# Patient Record
Sex: FEMALE | Race: AMERICAN INDIAN OR ALASKA NATIVE | NOT HISPANIC OR LATINO | URBAN - METROPOLITAN AREA
[De-identification: names, ages, dates, MRNs, and addresses within clinical notes are randomized per-mention and may not be internally consistent; named-entity substitution may affect disease eponyms.]

---

## 2023-08-20 ENCOUNTER — HOSPITAL ENCOUNTER (INPATIENT)
Facility: HOSPITAL | Age: 83
LOS: 3 days | Discharge: HOME HEALTH | DRG: 390 | End: 2023-08-25
Attending: EMERGENCY MEDICINE | Admitting: HOSPITALIST
Payer: MEDICARE

## 2023-08-20 DIAGNOSIS — K56.7 ILEUS: ICD-10-CM

## 2023-08-20 DIAGNOSIS — K56.600 PARTIAL SMALL BOWEL OBSTRUCTION: Primary | ICD-10-CM

## 2023-08-20 DIAGNOSIS — K59.03 CONSTIPATION DUE TO OPIOID THERAPY: ICD-10-CM

## 2023-08-20 DIAGNOSIS — T40.2X5A CONSTIPATION DUE TO OPIOID THERAPY: ICD-10-CM

## 2023-08-20 LAB
ALBUMIN SERPL BCP-MCNC: 3.3 G/DL (ref 3.5–5.2)
ALP SERPL-CCNC: 89 U/L (ref 55–135)
ALT SERPL W/O P-5'-P-CCNC: 16 U/L (ref 10–44)
ANION GAP SERPL CALC-SCNC: 14 MMOL/L (ref 8–16)
AST SERPL-CCNC: 24 U/L (ref 10–40)
BASOPHILS # BLD AUTO: 0.04 K/UL (ref 0–0.2)
BASOPHILS NFR BLD: 0.3 % (ref 0–1.9)
BILIRUB SERPL-MCNC: 0.4 MG/DL (ref 0.1–1)
BUN SERPL-MCNC: 15 MG/DL (ref 8–23)
CALCIUM SERPL-MCNC: 10.4 MG/DL (ref 8.7–10.5)
CHLORIDE SERPL-SCNC: 92 MMOL/L (ref 95–110)
CO2 SERPL-SCNC: 36 MMOL/L (ref 23–29)
CREAT SERPL-MCNC: 1.1 MG/DL (ref 0.5–1.4)
DIFFERENTIAL METHOD: ABNORMAL
EOSINOPHIL # BLD AUTO: 0 K/UL (ref 0–0.5)
EOSINOPHIL NFR BLD: 0.2 % (ref 0–8)
ERYTHROCYTE [DISTWIDTH] IN BLOOD BY AUTOMATED COUNT: 13.3 % (ref 11.5–14.5)
EST. GFR  (NO RACE VARIABLE): 50.2 ML/MIN/1.73 M^2
GLUCOSE SERPL-MCNC: 149 MG/DL (ref 70–110)
HCT VFR BLD AUTO: 34.9 % (ref 37–48.5)
HGB BLD-MCNC: 11.8 G/DL (ref 12–16)
IMM GRANULOCYTES # BLD AUTO: 0.14 K/UL (ref 0–0.04)
IMM GRANULOCYTES NFR BLD AUTO: 1.1 % (ref 0–0.5)
LYMPHOCYTES # BLD AUTO: 0.5 K/UL (ref 1–4.8)
LYMPHOCYTES NFR BLD: 4 % (ref 18–48)
MCH RBC QN AUTO: 30.4 PG (ref 27–31)
MCHC RBC AUTO-ENTMCNC: 33.8 G/DL (ref 32–36)
MCV RBC AUTO: 90 FL (ref 82–98)
MONOCYTES # BLD AUTO: 0.8 K/UL (ref 0.3–1)
MONOCYTES NFR BLD: 6.3 % (ref 4–15)
NEUTROPHILS # BLD AUTO: 11.6 K/UL (ref 1.8–7.7)
NEUTROPHILS NFR BLD: 88.1 % (ref 38–73)
NRBC BLD-RTO: 0 /100 WBC
PLATELET # BLD AUTO: 442 K/UL (ref 150–450)
PMV BLD AUTO: 9.4 FL (ref 9.2–12.9)
POTASSIUM SERPL-SCNC: 4 MMOL/L (ref 3.5–5.1)
PROT SERPL-MCNC: 6.7 G/DL (ref 6–8.4)
RBC # BLD AUTO: 3.88 M/UL (ref 4–5.4)
SODIUM SERPL-SCNC: 142 MMOL/L (ref 136–145)
WBC # BLD AUTO: 13.12 K/UL (ref 3.9–12.7)

## 2023-08-20 PROCEDURE — 94761 N-INVAS EAR/PLS OXIMETRY MLT: CPT | Mod: ER

## 2023-08-20 PROCEDURE — 99285 EMERGENCY DEPT VISIT HI MDM: CPT | Mod: ER

## 2023-08-20 PROCEDURE — 85025 COMPLETE CBC W/AUTO DIFF WBC: CPT | Mod: ER | Performed by: EMERGENCY MEDICINE

## 2023-08-20 PROCEDURE — 96361 HYDRATE IV INFUSION ADD-ON: CPT | Mod: ER

## 2023-08-20 PROCEDURE — 63600175 PHARM REV CODE 636 W HCPCS: Mod: ER | Performed by: EMERGENCY MEDICINE

## 2023-08-20 PROCEDURE — 80053 COMPREHEN METABOLIC PANEL: CPT | Mod: ER | Performed by: EMERGENCY MEDICINE

## 2023-08-20 RX ORDER — SODIUM CHLORIDE, SODIUM LACTATE, POTASSIUM CHLORIDE, CALCIUM CHLORIDE 600; 310; 30; 20 MG/100ML; MG/100ML; MG/100ML; MG/100ML
1000 INJECTION, SOLUTION INTRAVENOUS
Status: COMPLETED | OUTPATIENT
Start: 2023-08-20 | End: 2023-08-20

## 2023-08-20 RX ADMIN — SODIUM CHLORIDE, POTASSIUM CHLORIDE, SODIUM LACTATE AND CALCIUM CHLORIDE 500 ML: 600; 310; 30; 20 INJECTION, SOLUTION INTRAVENOUS at 11:08

## 2023-08-20 RX ADMIN — SODIUM CHLORIDE, POTASSIUM CHLORIDE, SODIUM LACTATE AND CALCIUM CHLORIDE 1000 ML: 600; 310; 30; 20 INJECTION, SOLUTION INTRAVENOUS at 10:08

## 2023-08-21 PROBLEM — T40.2X5A CONSTIPATION DUE TO OPIOID THERAPY: Status: ACTIVE | Noted: 2023-08-21

## 2023-08-21 PROBLEM — K56.609 SBO (SMALL BOWEL OBSTRUCTION): Status: ACTIVE | Noted: 2023-08-21

## 2023-08-21 PROBLEM — T40.605A NARCOTIC BOWEL SYNDROME DUE TO THERAPEUTIC USE: Status: ACTIVE | Noted: 2023-08-21

## 2023-08-21 PROBLEM — D72.829 LEUCOCYTOSIS: Status: ACTIVE | Noted: 2023-08-21

## 2023-08-21 PROBLEM — I10 HYPERTENSION: Status: ACTIVE | Noted: 2023-08-21

## 2023-08-21 PROBLEM — K59.03 CONSTIPATION DUE TO OPIOID THERAPY: Status: ACTIVE | Noted: 2023-08-21

## 2023-08-21 PROBLEM — K63.89 NARCOTIC BOWEL SYNDROME DUE TO THERAPEUTIC USE: Status: ACTIVE | Noted: 2023-08-21

## 2023-08-21 PROBLEM — D64.9 ANEMIA: Status: ACTIVE | Noted: 2023-08-21

## 2023-08-21 LAB
AMORPH CRY UR QL COMP ASSIST: ABNORMAL
BACTERIA #/AREA URNS AUTO: ABNORMAL /HPF
BILIRUB UR QL STRIP: NEGATIVE
CLARITY UR REFRACT.AUTO: CLEAR
COLOR UR AUTO: YELLOW
GLUCOSE UR QL STRIP: NEGATIVE
HGB UR QL STRIP: NEGATIVE
HYALINE CASTS UR QL AUTO: 5 /LPF
KETONES UR QL STRIP: NEGATIVE
LEUKOCYTE ESTERASE UR QL STRIP: NEGATIVE
MICROSCOPIC COMMENT: ABNORMAL
NITRITE UR QL STRIP: NEGATIVE
PH UR STRIP: >=9 [PH] (ref 5–8)
PROT UR QL STRIP: ABNORMAL
RBC #/AREA URNS AUTO: 3 /HPF (ref 0–4)
SP GR UR STRIP: 1.01 (ref 1–1.03)
SQUAMOUS #/AREA URNS AUTO: 1 /HPF
URN SPEC COLLECT METH UR: ABNORMAL
UROBILINOGEN UR STRIP-ACNC: <2 EU/DL
WBC #/AREA URNS AUTO: 1 /HPF (ref 0–5)

## 2023-08-21 PROCEDURE — 27000221 HC OXYGEN, UP TO 24 HOURS: Mod: ER

## 2023-08-21 PROCEDURE — 99223 PR INITIAL HOSPITAL CARE,LEVL III: ICD-10-PCS | Mod: ,,, | Performed by: SURGERY

## 2023-08-21 PROCEDURE — G0378 HOSPITAL OBSERVATION PER HR: HCPCS

## 2023-08-21 PROCEDURE — 96374 THER/PROPH/DIAG INJ IV PUSH: CPT | Mod: ER

## 2023-08-21 PROCEDURE — 96375 TX/PRO/DX INJ NEW DRUG ADDON: CPT | Mod: ER

## 2023-08-21 PROCEDURE — 99900035 HC TECH TIME PER 15 MIN (STAT)

## 2023-08-21 PROCEDURE — 94761 N-INVAS EAR/PLS OXIMETRY MLT: CPT

## 2023-08-21 PROCEDURE — 99223 1ST HOSP IP/OBS HIGH 75: CPT | Mod: ,,, | Performed by: SURGERY

## 2023-08-21 PROCEDURE — 81000 URINALYSIS NONAUTO W/SCOPE: CPT | Mod: ER | Performed by: EMERGENCY MEDICINE

## 2023-08-21 PROCEDURE — 63600175 PHARM REV CODE 636 W HCPCS: Mod: ER | Performed by: INTERNAL MEDICINE

## 2023-08-21 PROCEDURE — 63600175 PHARM REV CODE 636 W HCPCS: Mod: ER | Performed by: EMERGENCY MEDICINE

## 2023-08-21 PROCEDURE — 25500020 PHARM REV CODE 255: Mod: ER | Performed by: EMERGENCY MEDICINE

## 2023-08-21 PROCEDURE — 96376 TX/PRO/DX INJ SAME DRUG ADON: CPT | Mod: ER

## 2023-08-21 PROCEDURE — 96361 HYDRATE IV INFUSION ADD-ON: CPT

## 2023-08-21 PROCEDURE — 96376 TX/PRO/DX INJ SAME DRUG ADON: CPT

## 2023-08-21 PROCEDURE — 96372 THER/PROPH/DIAG INJ SC/IM: CPT | Performed by: STUDENT IN AN ORGANIZED HEALTH CARE EDUCATION/TRAINING PROGRAM

## 2023-08-21 PROCEDURE — A9698 NON-RAD CONTRAST MATERIALNOC: HCPCS | Mod: ER | Performed by: EMERGENCY MEDICINE

## 2023-08-21 PROCEDURE — 63600175 PHARM REV CODE 636 W HCPCS: Performed by: STUDENT IN AN ORGANIZED HEALTH CARE EDUCATION/TRAINING PROGRAM

## 2023-08-21 RX ORDER — SODIUM CHLORIDE 0.9 % (FLUSH) 0.9 %
10 SYRINGE (ML) INJECTION
Status: DISCONTINUED | OUTPATIENT
Start: 2023-08-21 | End: 2023-08-25 | Stop reason: HOSPADM

## 2023-08-21 RX ORDER — MORPHINE SULFATE 4 MG/ML
4 INJECTION, SOLUTION INTRAMUSCULAR; INTRAVENOUS EVERY 4 HOURS PRN
Status: DISCONTINUED | OUTPATIENT
Start: 2023-08-21 | End: 2023-08-21

## 2023-08-21 RX ORDER — MUPIROCIN 20 MG/G
OINTMENT TOPICAL
COMMUNITY
Start: 2023-07-27

## 2023-08-21 RX ORDER — MORPHINE SULFATE 4 MG/ML
4 INJECTION, SOLUTION INTRAMUSCULAR; INTRAVENOUS
Status: COMPLETED | OUTPATIENT
Start: 2023-08-21 | End: 2023-08-21

## 2023-08-21 RX ORDER — OXYCODONE AND ACETAMINOPHEN 10; 325 MG/1; MG/1
1 TABLET ORAL 4 TIMES DAILY PRN
Status: ON HOLD | COMMUNITY
Start: 2023-07-17 | End: 2023-08-25 | Stop reason: HOSPADM

## 2023-08-21 RX ORDER — MORPHINE SULFATE 2 MG/ML
1 INJECTION, SOLUTION INTRAMUSCULAR; INTRAVENOUS EVERY 6 HOURS PRN
Status: DISCONTINUED | OUTPATIENT
Start: 2023-08-21 | End: 2023-08-24

## 2023-08-21 RX ORDER — ONDANSETRON 4 MG/1
4 TABLET, FILM COATED ORAL EVERY 6 HOURS PRN
COMMUNITY
Start: 2023-08-15

## 2023-08-21 RX ORDER — DEXTROSE, SODIUM CHLORIDE, SODIUM LACTATE, POTASSIUM CHLORIDE, AND CALCIUM CHLORIDE 5; .6; .31; .03; .02 G/100ML; G/100ML; G/100ML; G/100ML; G/100ML
INJECTION, SOLUTION INTRAVENOUS CONTINUOUS
Status: DISCONTINUED | OUTPATIENT
Start: 2023-08-21 | End: 2023-08-25 | Stop reason: HOSPADM

## 2023-08-21 RX ORDER — ENOXAPARIN SODIUM 100 MG/ML
40 INJECTION SUBCUTANEOUS EVERY 24 HOURS
Status: DISCONTINUED | OUTPATIENT
Start: 2023-08-21 | End: 2023-08-25 | Stop reason: HOSPADM

## 2023-08-21 RX ORDER — HYDRALAZINE HYDROCHLORIDE 20 MG/ML
10 INJECTION INTRAMUSCULAR; INTRAVENOUS EVERY 6 HOURS PRN
Status: DISCONTINUED | OUTPATIENT
Start: 2023-08-21 | End: 2023-08-25 | Stop reason: HOSPADM

## 2023-08-21 RX ORDER — ONDANSETRON 2 MG/ML
4 INJECTION INTRAMUSCULAR; INTRAVENOUS
Status: COMPLETED | OUTPATIENT
Start: 2023-08-21 | End: 2023-08-21

## 2023-08-21 RX ORDER — MORPHINE SULFATE 2 MG/ML
2 INJECTION, SOLUTION INTRAMUSCULAR; INTRAVENOUS EVERY 4 HOURS PRN
Status: DISCONTINUED | OUTPATIENT
Start: 2023-08-21 | End: 2023-08-24

## 2023-08-21 RX ORDER — AZELAIC ACID 0.15 G/G
GEL TOPICAL
COMMUNITY

## 2023-08-21 RX ORDER — ONDANSETRON 2 MG/ML
4 INJECTION INTRAMUSCULAR; INTRAVENOUS EVERY 6 HOURS PRN
Status: DISCONTINUED | OUTPATIENT
Start: 2023-08-21 | End: 2023-08-25 | Stop reason: HOSPADM

## 2023-08-21 RX ADMIN — SODIUM CHLORIDE, SODIUM LACTATE, POTASSIUM CHLORIDE, CALCIUM CHLORIDE AND DEXTROSE MONOHYDRATE: 5; 600; 310; 30; 20 INJECTION, SOLUTION INTRAVENOUS at 06:08

## 2023-08-21 RX ADMIN — IOHEXOL 500 ML: 12 SOLUTION ORAL at 12:08

## 2023-08-21 RX ADMIN — MORPHINE SULFATE 4 MG: 4 INJECTION INTRAVENOUS at 08:08

## 2023-08-21 RX ADMIN — ONDANSETRON 4 MG: 2 INJECTION INTRAMUSCULAR; INTRAVENOUS at 12:08

## 2023-08-21 RX ADMIN — ONDANSETRON 4 MG: 2 INJECTION INTRAMUSCULAR; INTRAVENOUS at 08:08

## 2023-08-21 RX ADMIN — MORPHINE SULFATE 4 MG: 4 INJECTION INTRAVENOUS at 02:08

## 2023-08-21 RX ADMIN — MORPHINE SULFATE 1 MG: 2 INJECTION, SOLUTION INTRAMUSCULAR; INTRAVENOUS at 07:08

## 2023-08-21 RX ADMIN — ENOXAPARIN SODIUM 40 MG: 40 INJECTION SUBCUTANEOUS at 05:08

## 2023-08-21 NOTE — HPI
[age] Year old female who underwent a lower back surgery on August 11th 2023.  For intractable lower back pain.  Done at the [facility]. .  She was hospitalized for several is postoperatively and discharged home.  At home she was taking Percocet q.4 hours.  She began to have abdominal discomfort and bowel distention and nausea and vomiting.  She is not had any bowel movements for the last 6 days.  She presented to CT scan was done with oral contrast and there was concern for a bowel obstruction versus a ileus.

## 2023-08-21 NOTE — ASSESSMENT & PLAN NOTE
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  Status   NG tube wall suction  Ice chips permissible   Check abdominal x-rays in the morning.      See small-bowel obstruction

## 2023-08-21 NOTE — ASSESSMENT & PLAN NOTE
Xray/ CT imaging with findings suggestive of SBO vs ileus  Currently with NG tube   General surgery on board   Recommended to continue NG tube, follow-up on x-ray imaging in a.m., Gastrografin small-bowel follow-through as needed   Pain medications, antiemetics   NPO

## 2023-08-21 NOTE — CONSULTS
[facility] ([facility])  General Surgery  Consult Note    Patient Name: Jessenia Pa  MRN: [id number]  Code Status: Full Code  Admission Date: 8/20/2023  Hospital Length of Stay: 0 days  Attending Physician: Levar Faustin MD  Primary Care Provider: Ana Brown MD    Patient information was obtained from patient, relative(s) and ER records.     Inpatient consult to General Surgery  Consult performed by: Ludwig Aguila MD  Consult ordered by: Hay Rojas MD  Reason for consult: Reduction versus narcotic induced bowel dysfunction  Assessment/Recommendations: Robotic use this likely represents narcotic bowel obstruction.  Check in the through.      May need a Gastrografin follow-through to determine if bowel or as dot narcotic dysfunction    This is likely acute narcotic motility.    Patient may benefit from a GI consult once a small-bowel obstruction has been ruled out.        Subjective:     Principal Problem: <principal problem not specified>    History of Present Illness: [age] Year old female who underwent a lower back surgery on August 11th 2023.  For intractable lower back pain.  Done at the [facility]. .  She was hospitalized for several is postoperatively and discharged home.  At home she was taking Percocet q.4 hours.  She began to have abdominal discomfort and bowel distention and nausea and vomiting.  She is not had any bowel movements for the last 6 days.  She presented to CT scan was done with oral contrast and there was concern for a bowel obstruction versus a ileus.          No current facility-administered medications on file prior to encounter.     Current Outpatient Medications on File Prior to Encounter   Medication Sig    amLODIPine (NORVASC) 5 MG tablet Take 1 tablet by mouth once daily.    atorvastatin (LIPITOR) 10 MG tablet Take 10 mg by mouth.    azelaic acid (AZELEX) 15 % gel Place onto the skin.    cetirizine (ZYRTEC) 10 MG tablet Take 10  mg by mouth.    esomeprazole magnesium 20 mg TbEC Take 20 mg by mouth.    mupirocin (BACTROBAN) 2 % ointment Apply to nostrils twice daily and continue through at least 5 days post-op    ondansetron (ZOFRAN) 4 MG tablet Take 4 mg by mouth every 6 (six) hours as needed.    oxybutynin (DITROPAN XL) 15 MG TR24 Take 1 tablet by mouth once daily.    oxyCODONE-acetaminophen (PERCOCET)  mg per tablet Take 1 tablet by mouth 4 (four) times daily as needed.    traZODone (DESYREL) 100 MG tablet Take 1 tablet by mouth every evening.    celecoxib (CELEBREX) 200 MG capsule Take 1 capsule by mouth once daily.    nystatin-triamcinolone (MYCOLOG II) cream Apply under breasts topically twice daily for 14 days, then use as needed    sulfamethoxazole-trimethoprim 800-160mg (BACTRIM DS) 800-160 mg Tab Take 1 tablet by mouth 2 (two) times daily with meals.       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[place]     Past Surgical History:   Procedure Laterality Date    APPENDECTOMY      BLADDER SURGERY      COLONOSCOPY  2008    MULTIPLE BENIGN POLYPS    CYSTOSCOPY  2014    HEMATURIA    EPIDURAL STEROID INJECTION INTO LUMBAR SPINE      KNEE SURGERY      LUMBAR FUSION  08/11/2023    REPAIR OF LIGAMENT OF ANKLE       Family History       Problem Relation (Age of Onset)    Brain cancer Brother    Heart disease Father          Tobacco Use    Smoking status: Never    Smokeless tobacco: Never   Substance and Sexual Activity    Alcohol use: Never    Drug use: Never    Sexual activity: Never     Review of Systems    Constitutional:  Positive for fatigue. Negative for appetite change, chills, fever and unexpected weight change.   HENT:  Negative for hearing loss and rhinorrhea.    Eyes:  Negative for visual disturbance.   Respiratory:  Negative for apnea, cough, shortness of breath and wheezing.    Cardiovascular:  Negative for chest pain and palpitations.   Gastrointestinal:  Positive for abdominal pain, constipation, nausea and vomiting. Negative for abdominal distention, blood in stool and diarrhea.   Genitourinary:  Negative for dysuria, frequency and urgency.   Musculoskeletal:  Negative for arthralgias and neck pain.   Skin:  Negative for rash.        Skin irritation   Neurological:  Negative for seizures, weakness, numbness and headaches.   Hematological:  Negative for adenopathy. Does not bruise/bleed easily.   Psychiatric/Behavioral:  Negative for hallucinations. The patient is not nervous/anxious.      Objective:     Vital Signs (Most Recent):  Temp: 98.6 °F (37 °C) (08/21/23 1228)  Pulse: 86 (08/21/23 1228)  Resp: 17 (08/21/23 1228)  BP: 139/63 (08/21/23 1228)  SpO2: 99 % (08/21/23 1228) Vital Signs (24h Range):  Temp:  [98.5 °F (36.9 °C)-98.7 °F (37.1 °C)] 98.6 °F (37 °C)  Pulse:  [] 86  Resp:  [17-20] 17  SpO2:  [90 %-100 %] 99 %  BP: (122-170)/(55-77) 139/63     Weight: 57 kg (125 lb 10.6 oz)  Body mass index is 22.98 kg/m².     Physical Exam  Vitals reviewed.   Constitutional:       Appearance: She is well-developed.   HENT:      Head: Normocephalic.   Eyes:      Pupils: Pupils are equal, round, and reactive to light.   Neck:      Thyroid: No thyromegaly.      Vascular: No JVD.      Trachea: No tracheal deviation.   Cardiovascular:      Rate and Rhythm: Normal rate and regular rhythm.      Heart sounds: Normal heart sounds.   Pulmonary:      Breath sounds: Normal breath sounds. No wheezing.   Abdominal:      General: Bowel sounds are normal. There is distension.      Palpations: Abdomen is soft. Abdomen is  not rigid. There is no mass.      Tenderness: There is no abdominal tenderness (Minimal). There is no guarding or rebound. Right CVA tenderness: mild.  Musculoskeletal:         General: Normal range of motion.   Lymphadenopathy:      Cervical: No cervical adenopathy.   Skin:     General: Skin is warm and dry.      Findings: No erythema or rash.      Comments:  healing skin excoriations of the upper back.  There is incision on the lower back   Neurological:      General: No focal deficit present.      Mental Status: She is oriented to person, place, and time.   Psychiatric:         Mood and Affect: Mood normal.         Behavior: Behavior normal.         Thought Content: Thought content normal.         Judgment: Judgment normal.            I have reviewed all pertinent lab results within the past 24 hours.  CBC:   Recent Labs   Lab 08/20/23  2254   WBC 13.12*   RBC 3.88*   HGB 11.8*   HCT 34.9*      MCV 90   MCH 30.4   MCHC 33.8     BMP:   Recent Labs   Lab 08/20/23  2254   *      K 4.0   CL 92*   CO2 36*   BUN 15   CREATININE 1.1   CALCIUM 10.4     CMP:   Recent Labs   Lab 08/20/23  2254   *   CALCIUM 10.4   ALBUMIN 3.3*   PROT 6.7      K 4.0   CO2 36*   CL 92*   BUN 15   CREATININE 1.1   ALKPHOS 89   ALT 16   AST 24   BILITOT 0.4       Significant Diagnostics:  I have reviewed all pertinent imaging results/findings within the past 24 hours.     EXAMINATION:  XR ABDOMEN FLAT AND ERECT     CLINICAL HISTORY:  constipation;     TECHNIQUE:  Flat and erect AP views of the abdomen were performed.     COMPARISON:  None     FINDINGS:  Spinal hardware.  Right upper quadrant right lower quadrant surgical clips.  Bowel obstruction is identified.  Mild moderate amount of stool in the colon.     Impression:     Findings consistent with small-bowel obstruction.  Moderate amount of stool in the colon        Electronically signed by: Carl Clemens  Date:                                             08/20/2023  Time:                                           23:50    EXAM: CT ABDOMEN PELVIS WITHOUT CONTRAST     CLINICAL HISTORY: Abdominal pain and distention.     COMPARISON: 04/15/2022.     TECHNIQUE: Axial CT imaging through the abdomen and pelvis performed without intravenous contrast are submitted for interpretation. Multiplanar reformats were performed and interpreted.     FINDINGS:     Abdomen:  Lung bases are clear.  The liver is normal in size and contour without focal mass.  No hydronephrosis.  No solid renal mass identified.  The spleen, adrenal glands, and pancreas are within normal limits.  Gallbladder surgically absent.     Trace free fluid in the pelvis. The stomach and small bowel are distended with air-fluid levels.  Maximum diameter of the small bowel is 3.8 cm in the right lower quadrant of the abdomen.  Decompressed ileum in the right lower quadrant of the abdomen.  Specifically abrupt transition point.  The brachial transition to normal caliber small bowel within the midabdomen.No abdominal lymphadenopathy.        Aorta caliber is normal.     Pelvis   Few gas bubbles within otherwise unremarkable urinary bladder.  Appendix not visualized.     The bones are intact.  Prior lumbar fusion posteriorly L3/L4.           Impression:        1.  Partial small bowel obstruction versus ileus.  Consider in NG tube for decompression.  No specific transition point.  2.  Foci of gas within the bladder.  Correlate with recent Allison catheter placement versus cystitis.        All CT scans at [this location] are performed using dose modulation techniques as appropriate to a performed exam including the following: automated exposure control; adjustment of the mA and/or kV according to patient size (this includes techniques or standardized protocols for targeted exams where dose is matched to indication / reason for exam; i.e. extremities or head); use of iterative reconstruction technique.     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  Status   NG tube wall suction  Ice chips permissible   Check abdominal x-rays in the morning.      See small-bowel obstruction    SBO (small bowel obstruction)  Small-bowel obstruction is a possibility, however this may be simply related to narcotic induced brittany\dysmotility.  May Require a Gastrografin small-bowel follow-through for further evaluation and management      VTE Risk Mitigation (From admission, onward)         Ordered     IP VTE HIGH RISK PATIENT  Once         08/21/23 1211     Place sequential compression device  Until discontinued         08/21/23 1211                Thank you for your consult. I will follow-up with patient. Please contact us if you have any additional questions.    Ludwig Aguila MD  General Surgery  [facility] - Tele[facility] ([facility])

## 2023-08-21 NOTE — SUBJECTIVE & OBJECTIVE
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     Current Outpatient Medications on File Prior to Encounter   Medication Sig    amLODIPine (NORVASC) 5 MG tablet Take 1 tablet by mouth once daily.    atorvastatin (LIPITOR) 10 MG tablet Take 10 mg by mouth.    azelaic acid (AZELEX) 15 % gel Place onto the skin.    cetirizine (ZYRTEC) 10 MG tablet Take 10 mg by mouth.    esomeprazole magnesium 20 mg TbEC Take 20 mg by mouth.    mupirocin (BACTROBAN) 2 % ointment Apply to nostrils twice daily and continue through at least 5 days post-op    ondansetron (ZOFRAN) 4 MG tablet Take 4 mg by mouth every 6 (six) hours as needed.    oxybutynin (DITROPAN XL) 15 MG TR24 Take 1 tablet by mouth once daily.    oxyCODONE-acetaminophen (PERCOCET)  mg per tablet Take 1 tablet by mouth 4 (four) times daily as needed.    traZODone (DESYREL) 100 MG tablet Take 1 tablet by mouth every evening.    celecoxib (CELEBREX) 200 MG capsule Take 1 capsule by mouth once daily.    nystatin-triamcinolone (MYCOLOG II) cream Apply under breasts topically twice daily for 14 days, then use as needed    sulfamethoxazole-trimethoprim 800-160mg (BACTRIM DS) 800-160 mg Tab Take 1 tablet  by mouth 2 (two) times daily with meals.     Family History       Problem Relation (Age of Onset)    Brain cancer Brother    Heart disease Father          Tobacco Use    Smoking status: Never    Smokeless tobacco: Never   Substance and Sexual Activity    Alcohol use: Never    Drug use: Never    Sexual activity: Never     Review of Systems      Constitutional:  Negative for activity change, fatigue and fever.   HENT:  Negative for congestion, ear pain, facial swelling, nosebleeds, sinus pressure and sore throat.    Eyes:  Negative for pain, discharge, redness and visual disturbance.   Respiratory:  Negative for cough, choking, chest tightness, shortness of breath and wheezing.    Cardiovascular:  Negative for chest pain, palpitations and leg swelling.   Gastrointestinal:  Positive for constipation and vomiting. Negative for abdominal distention, abdominal pain and nausea.   Endocrine: Negative for heat intolerance, polydipsia and polyuria.   Genitourinary:  Negative for difficulty urinating, dysuria, flank pain, hematuria and urgency.   Musculoskeletal:  Positive for back pain. Negative for gait problem, joint swelling and myalgias.   Skin:  Negative for color change and rash.   Allergic/Immunologic: Negative for environmental allergies and food allergies.   Neurological:  Negative for dizziness, weakness, numbness and headaches.   Hematological:  Negative for adenopathy. Does not bruise/bleed easily.   Psychiatric/Behavioral:  Negative for agitation and behavioral problems. The patient is not nervous/anxious.        Objective:     Vital Signs (Most Recent):  Temp: 98.6 °F (37 °C) (08/21/23 1228)  Pulse: 86 (08/21/23 1228)  Resp: 17 (08/21/23 1228)  BP: 139/63 (08/21/23 1228)  SpO2: 99 % (08/21/23 1228) Vital Signs (24h Range):  Temp:  [98.5 °F (36.9 °C)-98.7 °F (37.1 °C)] 98.6 °F (37 °C)  Pulse:  [] 86  Resp:  [17-20] 17  SpO2:  [90 %-100 %] 99 %  BP: (122-170)/(55-77) 139/63     Weight: 57 kg (125 lb 10.6  oz)  Body mass index is 22.98 kg/m².     Physical Exam           Constitutional: She appears well-developed and well-nourished. She is not diaphoretic. No distress.   HENT:   Head: Normocephalic and atraumatic.   Mouth/Throat: No oropharyngeal exudate.   Eyes: Conjunctivae and EOM are normal. Pupils are equal, round, and reactive to light. Right eye exhibits no discharge. Left eye exhibits no discharge. No scleral icterus.   Neck: Neck supple. No thyromegaly present. No tracheal deviation present. No JVD present.   Normal range of motion.  Cardiovascular:  Normal rate, regular rhythm, normal heart sounds and intact distal pulses.     Exam reveals no gallop and no friction rub.       No murmur heard.  Pulmonary/Chest: Breath sounds normal. No stridor. No respiratory distress. She has no wheezes. She has no rhonchi. She has no rales. She exhibits no tenderness.   Abdominal: Abdomen is soft. Bowel sounds are normal. She exhibits no distension and no mass. There is no abdominal tenderness. There is no rebound and no guarding.   Musculoskeletal:         General: No tenderness or edema.      Cervical back: Normal range of motion and neck supple.      Comments: Back surgical wound not undressed      Neurological: She is alert and oriented to person, place, and time. She has normal strength.   Skin: Skin is warm and dry. No rash and no abscess noted. No erythema.   Psychiatric: She has a normal mood and affect. Her behavior is normal. Judgment and thought content normal.   Significant Labs: All pertinent labs within the past 24 hours have been reviewed.  CBC:   Recent Labs   Lab 08/20/23  2254   WBC 13.12*   HGB 11.8*   HCT 34.9*        CMP:   Recent Labs   Lab 08/20/23  2254      K 4.0   CL 92*   CO2 36*   *   BUN 15   CREATININE 1.1   CALCIUM 10.4   PROT 6.7   ALBUMIN 3.3*   BILITOT 0.4   ALKPHOS 89   AST 24   ALT 16   ANIONGAP 14       Significant Imaging:     Imaging Results              CT Abdomen  Pelvis  Without Contrast (Final result)  Result time 08/21/23 04:46:16      Final result by Tom Aldana Jr., MD (08/21/23 04:46:16)                   Impression:        1.  Partial small bowel obstruction versus ileus.  Consider in NG tube for decompression.  No specific transition point.  2.  Foci of gas within the bladder.  Correlate with recent Allison catheter placement versus cystitis.      All CT scans at [this location] are performed using dose modulation techniques as appropriate to a performed exam including the following: automated exposure control; adjustment of the mA and/or kV according to patient size (this includes techniques or standardized protocols for targeted exams where dose is matched to indication / reason for exam; i.e. extremities or head); use of iterative reconstruction technique.    Finalized on: 8/21/2023 4:46 AM By:  Tom Aldana MD  [facility]# [id number]      2023-08-21 04:49:29.619    [facility]               Narrative:    EXAM: CT ABDOMEN PELVIS WITHOUT CONTRAST    CLINICAL HISTORY: Abdominal pain and distention.    COMPARISON: 04/15/2022.    TECHNIQUE: Axial CT imaging through the abdomen and pelvis performed without intravenous contrast are submitted for interpretation. Multiplanar reformats were performed and interpreted.    FINDINGS:    Abdomen:  Lung bases are clear.  The liver is normal in size and contour without focal mass.  No hydronephrosis.  No solid renal mass identified.  The spleen, adrenal glands, and pancreas are within normal limits.  Gallbladder surgically absent.    Trace free fluid in the pelvis. The stomach and small bowel are distended with air-fluid levels.  Maximum diameter of the small bowel is 3.8 cm in the right lower quadrant of the abdomen.  Decompressed ileum in the right lower quadrant of the abdomen.  Specifically abrupt transition point.  The brachial transition to normal caliber small bowel within the midabdomen.No abdominal lymphadenopathy.      Aorta  caliber is normal.    Pelvis   Few gas bubbles within otherwise unremarkable urinary bladder.  Appendix not visualized.    The bones are intact.  Prior lumbar fusion posteriorly L3/L4.                                           X-Ray Abdomen Flat And Erect (Final result)  Result time 08/20/23 23:50:23      Final result by Carl Clemens MD (08/20/23 23:50:23)                   Impression:      Findings consistent with small-bowel obstruction.  Moderate amount of stool in the colon      Electronically signed by: Carl Clemens  Date:    08/20/2023  Time:    23:50               Narrative:    EXAMINATION:  XR ABDOMEN FLAT AND ERECT    CLINICAL HISTORY:  constipation;    TECHNIQUE:  Flat and erect AP views of the abdomen were performed.    COMPARISON:  None    FINDINGS:  Spinal hardware.  Right upper quadrant right lower quadrant surgical clips.  Bowel obstruction is identified.  Mild moderate amount of stool in the colon.

## 2023-08-21 NOTE — ED PROVIDER NOTES
Encounter Date: 8/20/2023       History     Chief Complaint   Patient presents with    Constipation     Had back surgery on Friday. Now complains of nausea, abd. distention and pain, and constipation since Saturday. LBM today.      Lumbar surgery about 10 days ago for intractable back pain, home for about 6 days, has stayed on Percocet q.4 hours, most recent dose this morning at about 09:30.  She has been constipated for several days, has taken some Dulcolax liquid and Surfak.  Earlier today she had some nausea and abdominal distention with vomiting, continued back pain, continue constipation.  Small amount of soft stool past earlier today.  No fever.  Not eating well, concerned about dehydration with elevated heart rate.  No fever.  No other specific complaints.  Counseled patient and family regarding opioid induced constipation.    The history is provided by the patient and a relative. No  was used.     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 [place]     Past Surgical History:   Procedure Laterality Date    APPENDECTOMY      BLADDER SURGERY      COLONOSCOPY  2008    MULTIPLE BENIGN POLYPS    CYSTOSCOPY  2014    HEMATURIA    EPIDURAL STEROID INJECTION INTO LUMBAR SPINE      KNEE SURGERY      LUMBAR FUSION  08/11/2023    REPAIR OF LIGAMENT OF ANKLE       Family History   Problem Relation Age of Onset    Heart disease Father     Brain cancer Brother      Social History     Tobacco Use    Smoking status: Never    Smokeless tobacco: Never   Substance Use Topics    Alcohol use: Never    Drug use: Never     Review of  Systems   Constitutional:  Negative for activity change, fatigue and fever.   HENT:  Negative for congestion, ear pain, facial swelling, nosebleeds, sinus pressure and sore throat.    Eyes:  Negative for pain, discharge, redness and visual disturbance.   Respiratory:  Negative for cough, choking, chest tightness, shortness of breath and wheezing.    Cardiovascular:  Negative for chest pain, palpitations and leg swelling.   Gastrointestinal:  Positive for constipation and vomiting. Negative for abdominal distention, abdominal pain and nausea.   Endocrine: Negative for heat intolerance, polydipsia and polyuria.   Genitourinary:  Negative for difficulty urinating, dysuria, flank pain, hematuria and urgency.   Musculoskeletal:  Positive for back pain. Negative for gait problem, joint swelling and myalgias.   Skin:  Negative for color change and rash.   Allergic/Immunologic: Negative for environmental allergies and food allergies.   Neurological:  Negative for dizziness, weakness, numbness and headaches.   Hematological:  Negative for adenopathy. Does not bruise/bleed easily.   Psychiatric/Behavioral:  Negative for agitation and behavioral problems. The patient is not nervous/anxious.    All other systems reviewed and are negative.      Physical Exam     Initial Vitals [08/20/23 2202]   BP Pulse Resp Temp SpO2   (!) 122/55 (!) 112 20 98.5 °F (36.9 °C) 98 %      MAP       --         Physical Exam    Nursing note and vitals reviewed.  Constitutional: She appears well-developed and well-nourished. She is not diaphoretic. No distress.   HENT:   Head: Normocephalic and atraumatic.   Mouth/Throat: No oropharyngeal exudate.   Eyes: Conjunctivae and EOM are normal. Pupils are equal, round, and reactive to light. Right eye exhibits no discharge. Left eye exhibits no discharge. No scleral icterus.   Neck: Neck supple. No thyromegaly present. No tracheal deviation present. No JVD present.   Normal range of motion.  Cardiovascular:   Normal rate, regular rhythm, normal heart sounds and intact distal pulses.     Exam reveals no gallop and no friction rub.       No murmur heard.  Pulmonary/Chest: Breath sounds normal. No stridor. No respiratory distress. She has no wheezes. She has no rhonchi. She has no rales. She exhibits no tenderness.   Abdominal: Abdomen is soft. Bowel sounds are normal. She exhibits no distension and no mass. There is no abdominal tenderness. There is no rebound and no guarding.   Musculoskeletal:         General: No tenderness or edema.      Cervical back: Normal range of motion and neck supple.      Comments: Back surgical wound not undressed     Neurological: She is alert and oriented to person, place, and time. She has normal strength.   Skin: Skin is warm and dry. No rash and no abscess noted. No erythema.   Psychiatric: She has a normal mood and affect. Her behavior is normal. Judgment and thought content normal.         ED Course   Procedures  Labs Reviewed   CBC W/ AUTO DIFFERENTIAL - Abnormal; Notable for the following components:       Result Value    WBC 13.12 (*)     RBC 3.88 (*)     Hemoglobin 11.8 (*)     Hematocrit 34.9 (*)     Immature Granulocytes 1.1 (*)     Gran # (ANC) 11.6 (*)     Immature Grans (Abs) 0.14 (*)     Lymph # 0.5 (*)     Gran % 88.1 (*)     Lymph % 4.0 (*)     All other components within normal limits   COMPREHENSIVE METABOLIC PANEL - Abnormal; Notable for the following components:    Chloride 92 (*)     CO2 36 (*)     Glucose 149 (*)     Albumin 3.3 (*)     eGFR 50.2 (*)     All other components within normal limits   URINALYSIS, REFLEX TO URINE CULTURE   URINALYSIS MICROSCOPIC          Imaging Results              CT Abdomen Pelvis  Without Contrast (Final result)  Result time 08/21/23 04:46:16      Final result by Tom Aldana Jr., MD (08/21/23 04:46:16)                   Impression:        1.  Partial small bowel obstruction versus ileus.  Consider in NG tube for decompression.  No  specific transition point.  2.  Foci of gas within the bladder.  Correlate with recent Allison catheter placement versus cystitis.      All CT scans at [this location] are performed using dose modulation techniques as appropriate to a performed exam including the following: automated exposure control; adjustment of the mA and/or kV according to patient size (this includes techniques or standardized protocols for targeted exams where dose is matched to indication / reason for exam; i.e. extremities or head); use of iterative reconstruction technique.    Finalized on: 8/21/2023 4:46 AM By:  Tom Aldana MD  [facility]# [id number]      2023-08-21 04:49:29.619    [facility]               Narrative:    EXAM: CT ABDOMEN PELVIS WITHOUT CONTRAST    CLINICAL HISTORY: Abdominal pain and distention.    COMPARISON: 04/15/2022.    TECHNIQUE: Axial CT imaging through the abdomen and pelvis performed without intravenous contrast are submitted for interpretation. Multiplanar reformats were performed and interpreted.    FINDINGS:    Abdomen:  Lung bases are clear.  The liver is normal in size and contour without focal mass.  No hydronephrosis.  No solid renal mass identified.  The spleen, adrenal glands, and pancreas are within normal limits.  Gallbladder surgically absent.    Trace free fluid in the pelvis. The stomach and small bowel are distended with air-fluid levels.  Maximum diameter of the small bowel is 3.8 cm in the right lower quadrant of the abdomen.  Decompressed ileum in the right lower quadrant of the abdomen.  Specifically abrupt transition point.  The brachial transition to normal caliber small bowel within the midabdomen.No abdominal lymphadenopathy.      Aorta caliber is normal.    Pelvis   Few gas bubbles within otherwise unremarkable urinary bladder.  Appendix not visualized.    The bones are intact.  Prior lumbar fusion posteriorly L3/L4.                                           X-Ray Abdomen Flat And Erect (Final result)   Result time 08/20/23 23:50:23      Final result by Carl Clemens MD (08/20/23 23:50:23)                   Impression:      Findings consistent with small-bowel obstruction.  Moderate amount of stool in the colon      Electronically signed by: Carl Clemens  Date:    08/20/2023  Time:    23:50               Narrative:    EXAMINATION:  XR ABDOMEN FLAT AND ERECT    CLINICAL HISTORY:  constipation;    TECHNIQUE:  Flat and erect AP views of the abdomen were performed.    COMPARISON:  None    FINDINGS:  Spinal hardware.  Right upper quadrant right lower quadrant surgical clips.  Bowel obstruction is identified.  Mild moderate amount of stool in the colon.                                      12:29 AM Feels a little better, somewhat nauseated, no other new complaints or problems.  Plain film findings noted, proceed with orally contrasted CT abdomen pelvis to try to differentiate obstruction from ileus/opioid induced constipation.      5:01 AM Clinically stable.  CT not able to differentiate between partial small bowel obstruction and ileus.  Proceeding with NG for decompression, patient requests [facility] as facility of choice for admission.    5:47 AM  General at [facility] - requests [facility].    All historical, clinical, radiographic, and laboratory findings were reviewed with the patient/family in detail along with the indications for transport to the facility in [place] in order to receive \[facility]\"" eval and rx.  All remaining questions and concerns were addressed at this time and the patient/family communicates understanding and agrees to proceed accordingly.  Similarly, all pertinent details of the encounter were discussed with [facility] Medicine - Dr. Faustin who agrees to receive the patient at [facility] [place] for further care as outlined above.  The patient will be transferred by [facility] ambulance services secondary to a need for ongoing IV fluids en route.  Dakota Kaur MD  5:47  AM             Medications   lactated ringers bolus 500 mL (0 mLs Intravenous Stopped 8/21/23 0002)   lactated ringers infusion (1,000 mLs Intravenous New Bag 8/20/23 2252)   ondansetron injection 4 mg (4 mg Intravenous Given 8/21/23 0042)   iohexoL (OMNIPAQUE 12) oral solution 500 mL (500 mLs Oral Given 8/21/23 0051)   morphine injection 4 mg (4 mg Intravenous Given 8/21/23 0258)     Medical Decision Making  Problems Addressed:  Constipation due to opioid therapy: acute illness or injury  Partial small bowel obstruction: acute illness or injury    Amount and/or Complexity of Data Reviewed  Labs: ordered. Decision-making details documented in ED Course.  Radiology: ordered. Decision-making details documented in ED Course.    Risk  Prescription drug management.  Decision regarding hospitalization.      Additional MDM:   Differential Diagnosis:   Constipation/ Ileus/ Obstruction                             Clinical Impression:   Final diagnoses:  [K59.03, T40.2X5A] Constipation due to opioid therapy  [K56.7] Ileus  [K56.600] Partial small bowel obstruction (Primary)        ED Disposition Condition    Observation Stable                Dakota Kaur MD  08/21/23 1022       Dakota Kaur MD  08/21/23 0548       Dakota Kaur MD  08/21/23 0665

## 2023-08-21 NOTE — ASSESSMENT & PLAN NOTE
Small-bowel obstruction is a possibility, however this may be simply related to narcotic induced brittany\dysmotility.  May Require a Gastrografin small-bowel follow-through for further evaluation and management

## 2023-08-21 NOTE — ASSESSMENT & PLAN NOTE
Likely reactive from underlying obstruction   Afebrile   Urine negative   Will hold antibiotics for now, we follow-up on fever,, WBC/monitor for signs of infection and consider antibiotics accordingly

## 2023-08-21 NOTE — SUBJECTIVE & OBJECTIVE
No current facility-administered medications on file prior to encounter.     Current Outpatient Medications on File Prior to Encounter   Medication Sig    amLODIPine (NORVASC) 5 MG tablet Take 1 tablet by mouth once daily.    atorvastatin (LIPITOR) 10 MG tablet Take 10 mg by mouth.    azelaic acid (AZELEX) 15 % gel Place onto the skin.    cetirizine (ZYRTEC) 10 MG tablet Take 10 mg by mouth.    esomeprazole magnesium 20 mg TbEC Take 20 mg by mouth.    mupirocin (BACTROBAN) 2 % ointment Apply to nostrils twice daily and continue through at least 5 days post-op    ondansetron (ZOFRAN) 4 MG tablet Take 4 mg by mouth every 6 (six) hours as needed.    oxybutynin (DITROPAN XL) 15 MG TR24 Take 1 tablet by mouth once daily.    oxyCODONE-acetaminophen (PERCOCET)  mg per tablet Take 1 tablet by mouth 4 (four) times daily as needed.    traZODone (DESYREL) 100 MG tablet Take 1 tablet by mouth every evening.    celecoxib (CELEBREX) 200 MG capsule Take 1 capsule by mouth once daily.    nystatin-triamcinolone (MYCOLOG II) cream Apply under breasts topically twice daily for 14 days, then use as needed    sulfamethoxazole-trimethoprim 800-160mg (BACTRIM DS) 800-160 mg Tab Take 1 tablet by mouth 2 (two) times daily with meals.       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 [place]     Past Surgical History:   Procedure Laterality Date    APPENDECTOMY      BLADDER SURGERY      COLONOSCOPY  2008    MULTIPLE BENIGN POLYPS    CYSTOSCOPY  2014    HEMATURIA    EPIDURAL STEROID INJECTION INTO LUMBAR SPINE      KNEE SURGERY      LUMBAR FUSION   08/11/2023    REPAIR OF LIGAMENT OF ANKLE       Family History       Problem Relation (Age of Onset)    Brain cancer Brother    Heart disease Father          Tobacco Use    Smoking status: Never    Smokeless tobacco: Never   Substance and Sexual Activity    Alcohol use: Never    Drug use: Never    Sexual activity: Never     Review of Systems   Constitutional:  Positive for fatigue. Negative for appetite change, chills, fever and unexpected weight change.   HENT:  Negative for hearing loss and rhinorrhea.    Eyes:  Negative for visual disturbance.   Respiratory:  Negative for apnea, cough, shortness of breath and wheezing.    Cardiovascular:  Negative for chest pain and palpitations.   Gastrointestinal:  Positive for abdominal pain, constipation, nausea and vomiting. Negative for abdominal distention, blood in stool and diarrhea.   Genitourinary:  Negative for dysuria, frequency and urgency.   Musculoskeletal:  Negative for arthralgias and neck pain.   Skin:  Negative for rash.        Skin irritation   Neurological:  Negative for seizures, weakness, numbness and headaches.   Hematological:  Negative for adenopathy. Does not bruise/bleed easily.   Psychiatric/Behavioral:  Negative for hallucinations. The patient is not nervous/anxious.      Objective:     Vital Signs (Most Recent):  Temp: 98.6 °F (37 °C) (08/21/23 1228)  Pulse: 86 (08/21/23 1228)  Resp: 17 (08/21/23 1228)  BP: 139/63 (08/21/23 1228)  SpO2: 99 % (08/21/23 1228) Vital Signs (24h Range):  Temp:  [98.5 °F (36.9 °C)-98.7 °F (37.1 °C)] 98.6 °F (37 °C)  Pulse:  [] 86  Resp:  [17-20] 17  SpO2:  [90 %-100 %] 99 %  BP: (122-170)/(55-77) 139/63     Weight: 57 kg (125 lb 10.6 oz)  Body mass index is 22.98 kg/m².     Physical Exam  Vitals reviewed.   Constitutional:       Appearance: She is well-developed.   HENT:      Head: Normocephalic.   Eyes:      Pupils: Pupils are equal, round, and reactive to light.   Neck:      Thyroid: No thyromegaly.       Vascular: No JVD.      Trachea: No tracheal deviation.   Cardiovascular:      Rate and Rhythm: Normal rate and regular rhythm.      Heart sounds: Normal heart sounds.   Pulmonary:      Breath sounds: Normal breath sounds. No wheezing.   Abdominal:      General: Bowel sounds are normal. There is distension.      Palpations: Abdomen is soft. Abdomen is not rigid. There is no mass.      Tenderness: There is no abdominal tenderness (Minimal). There is no guarding or rebound. Right CVA tenderness: mild.  Musculoskeletal:         General: Normal range of motion.   Lymphadenopathy:      Cervical: No cervical adenopathy.   Skin:     General: Skin is warm and dry.      Findings: No erythema or rash.      Comments:  healing skin excoriations of the upper back.  There is incision on the lower back   Neurological:      General: No focal deficit present.      Mental Status: She is oriented to person, place, and time.   Psychiatric:         Mood and Affect: Mood normal.         Behavior: Behavior normal.         Thought Content: Thought content normal.         Judgment: Judgment normal.            I have reviewed all pertinent lab results within the past 24 hours.  CBC:   Recent Labs   Lab 08/20/23  2254   WBC 13.12*   RBC 3.88*   HGB 11.8*   HCT 34.9*      MCV 90   MCH 30.4   MCHC 33.8     BMP:   Recent Labs   Lab 08/20/23  2254   *      K 4.0   CL 92*   CO2 36*   BUN 15   CREATININE 1.1   CALCIUM 10.4     CMP:   Recent Labs   Lab 08/20/23  2254   *   CALCIUM 10.4   ALBUMIN 3.3*   PROT 6.7      K 4.0   CO2 36*   CL 92*   BUN 15   CREATININE 1.1   ALKPHOS 89   ALT 16   AST 24   BILITOT 0.4       Significant Diagnostics:  I have reviewed all pertinent imaging results/findings within the past 24 hours.     EXAMINATION:  XR ABDOMEN FLAT AND ERECT     CLINICAL HISTORY:  constipation;     TECHNIQUE:  Flat and erect AP views of the abdomen were performed.     COMPARISON:  None     FINDINGS:  Spinal  hardware.  Right upper quadrant right lower quadrant surgical clips.  Bowel obstruction is identified.  Mild moderate amount of stool in the colon.     Impression:     Findings consistent with small-bowel obstruction.  Moderate amount of stool in the colon        Electronically signed by: Carl Clemens  Date:                                            08/20/2023  Time:                                           23:50    EXAM: CT ABDOMEN PELVIS WITHOUT CONTRAST     CLINICAL HISTORY: Abdominal pain and distention.     COMPARISON: 04/15/2022.     TECHNIQUE: Axial CT imaging through the abdomen and pelvis performed without intravenous contrast are submitted for interpretation. Multiplanar reformats were performed and interpreted.     FINDINGS:     Abdomen:  Lung bases are clear.  The liver is normal in size and contour without focal mass.  No hydronephrosis.  No solid renal mass identified.  The spleen, adrenal glands, and pancreas are within normal limits.  Gallbladder surgically absent.     Trace free fluid in the pelvis. The stomach and small bowel are distended with air-fluid levels.  Maximum diameter of the small bowel is 3.8 cm in the right lower quadrant of the abdomen.  Decompressed ileum in the right lower quadrant of the abdomen.  Specifically abrupt transition point.  The brachial transition to normal caliber small bowel within the midabdomen.No abdominal lymphadenopathy.        Aorta caliber is normal.     Pelvis   Few gas bubbles within otherwise unremarkable urinary bladder.  Appendix not visualized.     The bones are intact.  Prior lumbar fusion posteriorly L3/L4.           Impression:        1.  Partial small bowel obstruction versus ileus.  Consider in NG tube for decompression.  No specific transition point.  2.  Foci of gas within the bladder.  Correlate with recent Allison catheter placement versus cystitis.        All CT scans at [this location] are performed using dose modulation techniques as  appropriate to a performed exam including the following: automated exposure control; adjustment of the mA and/or kV according to patient size (this includes techniques or standardized protocols for targeted exams where dose is matched to indication / reason for exam; i.e. extremities or head); use of iterative reconstruction technique.     Finalized on: 8/21/2023 4:46 AM By:  Tom CHAIDEZ# [id number]      2023-08-21 04:49:29.619    DM

## 2023-08-21 NOTE — ASSESSMENT & PLAN NOTE
Likely narcotic induced   Currently with NG tube   General surgery on board   Recommended to continue NG tube, follow-up on x-ray imaging in a.m., Gastrografin small-bowel follow-through as needed   Pain medications, antiemetics   NPO

## 2023-08-21 NOTE — ASSESSMENT & PLAN NOTE
Denied any overt signs of bleeding  Follow-up on h and h and consider transfusion if hemoglobin less than 7

## 2023-08-21 NOTE — HOSPITAL COURSE
Had a nasogastric tube placed.  She was admitted by the medical service.  General surgery was asked to see the patient    08/22/2023.  Bilious NG output.  Abdominal films show normal small-bowel pattern.  There is stool noted in the colon.  Will give Colyte slowly.  Findings likely represent a narcotic induced obstipation    08/23/2023.  Patient became bloated with Colyte.  She underwent a brown bump MO per GI and has had multiple bowel movements.  She is not feel bloated.  She is bilious NG tube output.  Recommend Colyte 500 mL via NG tube.  NG tube can then be removed per Gastroenterology

## 2023-08-21 NOTE — H&P
[facility] Medicine  History & Physical    Patient Name: Jessenia Pa  MRN: [id number]  Patient Class: OP- Observation  Admission Date: 8/20/2023  Attending Physician: Levar Faustin MD   Primary Care Provider: Ana Brown MD         Patient information was obtained from patient and ER records.     Subjective:     Principal Problem:SBO (small bowel obstruction)    Chief Complaint:   Chief Complaint   Patient presents with    Constipation     Had back surgery on Friday. Now complains of nausea, abd. distention and pain, and constipation since Saturday. LBM today.         HPI: Ms. Wallis is an 82-year-old female with past medical history of hypertension, GERD, Raynauds D, insomnia, osteoporosis, ankle surgery 1977, cholecystectomy 2020, ventral hernia repair 2022,- recent lumbar disc surgery on August 11, 2023, (for chronic intractable low back pain, underwent at [facility]) presented to ED due to complaints of nausea, abdominal pain, discomfort, constipation over past couple of days, which got worsened; At home she was taking Percocet q.4 hours.  She began to have abdominal discomfort and bowel distention and nausea and vomiting.  She is not had any bowel movements for the last 6 days.  Not sure if she was taking stool softners or not; denied fever, chills, chest pain, burning micturition.  Stated that she last passed flatus 3 days ago.  She presented to ED, CT scan was done with oral contrast and there was concern for a bowel obstruction versus a ileus.          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     Current Outpatient Medications on File Prior to Encounter   Medication Sig    amLODIPine (NORVASC) 5 MG tablet Take 1 tablet by mouth once daily.    atorvastatin (LIPITOR) 10 MG tablet Take 10 mg by mouth.    azelaic acid (AZELEX) 15 % gel Place onto the skin.    cetirizine (ZYRTEC) 10 MG tablet Take 10 mg by mouth.    esomeprazole magnesium 20 mg TbEC Take 20 mg by mouth.    mupirocin (BACTROBAN) 2 % ointment Apply to nostrils twice daily and continue through at least 5 days post-op    ondansetron (ZOFRAN) 4 MG tablet Take 4 mg by mouth every 6 (six) hours as needed.    oxybutynin (DITROPAN XL) 15 MG TR24 Take 1 tablet by mouth once daily.    oxyCODONE-acetaminophen (PERCOCET)  mg per tablet Take 1 tablet by mouth 4 (four) times daily as needed.    traZODone (DESYREL) 100 MG tablet Take 1 tablet by mouth every evening.    celecoxib (CELEBREX) 200 MG capsule Take 1 capsule by mouth once daily.    nystatin-triamcinolone (MYCOLOG II) cream Apply under breasts topically twice daily for 14 days, then use as needed    sulfamethoxazole-trimethoprim 800-160mg (BACTRIM DS) 800-160 mg Tab Take 1 tablet by mouth 2 (two) times daily with meals.     Family History       Problem Relation (Age of Onset)    Brain cancer Brother    Heart disease Father           Tobacco Use    Smoking status: Never    Smokeless tobacco: Never   Substance and Sexual Activity    Alcohol use: Never    Drug use: Never    Sexual activity: Never     Review of Systems      Constitutional:  Negative for activity change, fatigue and fever.   HENT:  Negative for congestion, ear pain, facial swelling, nosebleeds, sinus pressure and sore throat.    Eyes:  Negative for pain, discharge, redness and visual disturbance.   Respiratory:  Negative for cough, choking, chest tightness, shortness of breath and wheezing.    Cardiovascular:  Negative for chest pain, palpitations and leg swelling.   Gastrointestinal:  Positive for constipation and vomiting. Negative for abdominal distention, abdominal pain and nausea.   Endocrine: Negative for heat intolerance, polydipsia and polyuria.   Genitourinary:  Negative for difficulty urinating, dysuria, flank pain, hematuria and urgency.   Musculoskeletal:  Positive for back pain. Negative for gait problem, joint swelling and myalgias.   Skin:  Negative for color change and rash.   Allergic/Immunologic: Negative for environmental allergies and food allergies.   Neurological:  Negative for dizziness, weakness, numbness and headaches.   Hematological:  Negative for adenopathy. Does not bruise/bleed easily.   Psychiatric/Behavioral:  Negative for agitation and behavioral problems. The patient is not nervous/anxious.        Objective:     Vital Signs (Most Recent):  Temp: 98.6 °F (37 °C) (08/21/23 1228)  Pulse: 86 (08/21/23 1228)  Resp: 17 (08/21/23 1228)  BP: 139/63 (08/21/23 1228)  SpO2: 99 % (08/21/23 1228) Vital Signs (24h Range):  Temp:  [98.5 °F (36.9 °C)-98.7 °F (37.1 °C)] 98.6 °F (37 °C)  Pulse:  [] 86  Resp:  [17-20] 17  SpO2:  [90 %-100 %] 99 %  BP: (122-170)/(55-77) 139/63     Weight: 57 kg (125 lb 10.6 oz)  Body mass index is 22.98 kg/m².     Physical Exam           Constitutional: She appears well-developed and well-nourished. She is not  diaphoretic. No distress.   HENT:   Head: Normocephalic and atraumatic.   Mouth/Throat: No oropharyngeal exudate.   Eyes: Conjunctivae and EOM are normal. Pupils are equal, round, and reactive to light. Right eye exhibits no discharge. Left eye exhibits no discharge. No scleral icterus.   Neck: Neck supple. No thyromegaly present. No tracheal deviation present. No JVD present.   Normal range of motion.  Cardiovascular:  Normal rate, regular rhythm, normal heart sounds and intact distal pulses.     Exam reveals no gallop and no friction rub.       No murmur heard.  Pulmonary/Chest: Breath sounds normal. No stridor. No respiratory distress. She has no wheezes. She has no rhonchi. She has no rales. She exhibits no tenderness.   Abdominal: Abdomen is soft. Bowel sounds are normal. She exhibits no distension and no mass. There is no abdominal tenderness. There is no rebound and no guarding.   Musculoskeletal:         General: No tenderness or edema.      Cervical back: Normal range of motion and neck supple.      Comments: Back surgical wound not undressed      Neurological: She is alert and oriented to person, place, and time. She has normal strength.   Skin: Skin is warm and dry. No rash and no abscess noted. No erythema.   Psychiatric: She has a normal mood and affect. Her behavior is normal. Judgment and thought content normal.   Significant Labs: All pertinent labs within the past 24 hours have been reviewed.  CBC:   Recent Labs   Lab 08/20/23  2254   WBC 13.12*   HGB 11.8*   HCT 34.9*        CMP:   Recent Labs   Lab 08/20/23  2254      K 4.0   CL 92*   CO2 36*   *   BUN 15   CREATININE 1.1   CALCIUM 10.4   PROT 6.7   ALBUMIN 3.3*   BILITOT 0.4   ALKPHOS 89   AST 24   ALT 16   ANIONGAP 14       Significant Imaging:     Imaging Results              CT Abdomen Pelvis  Without Contrast (Final result)  Result time 08/21/23 04:46:16      Final result by Tom Aldana Jr., MD (08/21/23 04:46:16)                    Impression:        1.  Partial small bowel obstruction versus ileus.  Consider in NG tube for decompression.  No specific transition point.  2.  Foci of gas within the bladder.  Correlate with recent Allison catheter placement versus cystitis.      All CT scans at [this location] are performed using dose modulation techniques as appropriate to a performed exam including the following: automated exposure control; adjustment of the mA and/or kV according to patient size (this includes techniques or standardized protocols for targeted exams where dose is matched to indication / reason for exam; i.e. extremities or head); use of iterative reconstruction technique.    Finalized on: 8/21/2023 4:46 AM By:  Tom Aldana MD  [facility]# [id number]      2023-08-21 04:49:29.619    [facility]               Narrative:    EXAM: CT ABDOMEN PELVIS WITHOUT CONTRAST    CLINICAL HISTORY: Abdominal pain and distention.    COMPARISON: 04/15/2022.    TECHNIQUE: Axial CT imaging through the abdomen and pelvis performed without intravenous contrast are submitted for interpretation. Multiplanar reformats were performed and interpreted.    FINDINGS:    Abdomen:  Lung bases are clear.  The liver is normal in size and contour without focal mass.  No hydronephrosis.  No solid renal mass identified.  The spleen, adrenal glands, and pancreas are within normal limits.  Gallbladder surgically absent.    Trace free fluid in the pelvis. The stomach and small bowel are distended with air-fluid levels.  Maximum diameter of the small bowel is 3.8 cm in the right lower quadrant of the abdomen.  Decompressed ileum in the right lower quadrant of the abdomen.  Specifically abrupt transition point.  The brachial transition to normal caliber small bowel within the midabdomen.No abdominal lymphadenopathy.      Aorta caliber is normal.    Pelvis   Few gas bubbles within otherwise unremarkable urinary bladder.  Appendix not visualized.    The bones are intact.   Prior lumbar fusion posteriorly L3/L4.                                           X-Ray Abdomen Flat And Erect (Final result)  Result time 08/20/23 23:50:23      Final result by Carl Clemens MD (08/20/23 23:50:23)                   Impression:      Findings consistent with small-bowel obstruction.  Moderate amount of stool in the colon      Electronically signed by: Carl Clemens  Date:    08/20/2023  Time:    23:50               Narrative:    EXAMINATION:  XR ABDOMEN FLAT AND ERECT    CLINICAL HISTORY:  constipation;    TECHNIQUE:  Flat and erect AP views of the abdomen were performed.    COMPARISON:  None    FINDINGS:  Spinal hardware.  Right upper quadrant right lower quadrant surgical clips.  Bowel obstruction is identified.  Mild moderate amount of stool in the colon.                                       Assessment/Plan:     * SBO (small bowel obstruction)    Likely narcotic induced   Currently with NG tube   General surgery on board   Recommended to continue NG tube, follow-up on x-ray imaging in a.m., Gastrografin small-bowel follow-through as needed   Pain medications, antiemetics   NPO         Anemia  Denied any overt signs of bleeding  Follow-up on h and h and consider transfusion if hemoglobin less than 7      Leucocytosis  Likely reactive from underlying obstruction   Afebrile   Urine negative   Will hold antibiotics for now, we follow-up on fever,, WBC/monitor for signs of infection and consider antibiotics accordingly      Hypertension  Currently NPO, hold medications as of now   Hydralazine p.r.n.      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  Xray/ CT imaging with findings suggestive of SBO vs ileus  Currently with NG tube   General surgery on board   Recommended to continue NG tube, follow-up on x-ray imaging in a.m., Gastrografin small-bowel follow-through as needed   Pain medications, antiemetics   NPO         VTE Risk Mitigation (From admission, onward)         Ordered     enoxaparin  injection 40 mg  Every 24 hours         08/21/23 1424     IP VTE HIGH RISK PATIENT  Once         08/21/23 1211     Place sequential compression device  Until discontinued         08/21/23 1211                   On 08/21/2023, patient should be placed in hospital observation services under my care.        Hay Rojas MD  Department of Hospital Medicine  [facility] - Telemetry ([facility]

## 2023-08-22 LAB
ANION GAP SERPL CALC-SCNC: 8 MMOL/L (ref 8–16)
BASOPHILS # BLD AUTO: 0.04 K/UL (ref 0–0.2)
BASOPHILS NFR BLD: 0.3 % (ref 0–1.9)
BUN SERPL-MCNC: 13 MG/DL (ref 8–23)
CALCIUM SERPL-MCNC: 8.9 MG/DL (ref 8.7–10.5)
CHLORIDE SERPL-SCNC: 98 MMOL/L (ref 95–110)
CO2 SERPL-SCNC: 34 MMOL/L (ref 23–29)
CREAT SERPL-MCNC: 0.7 MG/DL (ref 0.5–1.4)
DIFFERENTIAL METHOD: ABNORMAL
EOSINOPHIL # BLD AUTO: 0.3 K/UL (ref 0–0.5)
EOSINOPHIL NFR BLD: 2.7 % (ref 0–8)
ERYTHROCYTE [DISTWIDTH] IN BLOOD BY AUTOMATED COUNT: 13.6 % (ref 11.5–14.5)
EST. GFR  (NO RACE VARIABLE): >60 ML/MIN/1.73 M^2
GLUCOSE SERPL-MCNC: 105 MG/DL (ref 70–110)
HCT VFR BLD AUTO: 27.9 % (ref 37–48.5)
HGB BLD-MCNC: 9.3 G/DL (ref 12–16)
IMM GRANULOCYTES # BLD AUTO: 0.1 K/UL (ref 0–0.04)
IMM GRANULOCYTES NFR BLD AUTO: 0.9 % (ref 0–0.5)
LYMPHOCYTES # BLD AUTO: 1.2 K/UL (ref 1–4.8)
LYMPHOCYTES NFR BLD: 10 % (ref 18–48)
MAGNESIUM SERPL-MCNC: 1.8 MG/DL (ref 1.6–2.6)
MCH RBC QN AUTO: 30.8 PG (ref 27–31)
MCHC RBC AUTO-ENTMCNC: 33.3 G/DL (ref 32–36)
MCV RBC AUTO: 92 FL (ref 82–98)
MONOCYTES # BLD AUTO: 0.9 K/UL (ref 0.3–1)
MONOCYTES NFR BLD: 7.6 % (ref 4–15)
NEUTROPHILS # BLD AUTO: 9.2 K/UL (ref 1.8–7.7)
NEUTROPHILS NFR BLD: 78.5 % (ref 38–73)
NRBC BLD-RTO: 0 /100 WBC
PLATELET # BLD AUTO: 292 K/UL (ref 150–450)
PMV BLD AUTO: 9.1 FL (ref 9.2–12.9)
POTASSIUM SERPL-SCNC: 3.6 MMOL/L (ref 3.5–5.1)
RBC # BLD AUTO: 3.02 M/UL (ref 4–5.4)
SODIUM SERPL-SCNC: 140 MMOL/L (ref 136–145)
WBC # BLD AUTO: 11.64 K/UL (ref 3.9–12.7)

## 2023-08-22 PROCEDURE — 97530 THERAPEUTIC ACTIVITIES: CPT

## 2023-08-22 PROCEDURE — 96361 HYDRATE IV INFUSION ADD-ON: CPT

## 2023-08-22 PROCEDURE — 99222 1ST HOSP IP/OBS MODERATE 55: CPT | Mod: ,,, | Performed by: INTERNAL MEDICINE

## 2023-08-22 PROCEDURE — 99233 SBSQ HOSP IP/OBS HIGH 50: CPT | Mod: ,,, | Performed by: SURGERY

## 2023-08-22 PROCEDURE — 99233 PR SUBSEQUENT HOSPITAL CARE,LEVL III: ICD-10-PCS | Mod: ,,, | Performed by: SURGERY

## 2023-08-22 PROCEDURE — 21400001 HC TELEMETRY ROOM

## 2023-08-22 PROCEDURE — 25000003 PHARM REV CODE 250: Performed by: INTERNAL MEDICINE

## 2023-08-22 PROCEDURE — 96376 TX/PRO/DX INJ SAME DRUG ADON: CPT

## 2023-08-22 PROCEDURE — 99900035 HC TECH TIME PER 15 MIN (STAT)

## 2023-08-22 PROCEDURE — 25000003 PHARM REV CODE 250: Performed by: SURGERY

## 2023-08-22 PROCEDURE — 63600175 PHARM REV CODE 636 W HCPCS: Performed by: INTERNAL MEDICINE

## 2023-08-22 PROCEDURE — 80048 BASIC METABOLIC PNL TOTAL CA: CPT | Performed by: STUDENT IN AN ORGANIZED HEALTH CARE EDUCATION/TRAINING PROGRAM

## 2023-08-22 PROCEDURE — 27000221 HC OXYGEN, UP TO 24 HOURS

## 2023-08-22 PROCEDURE — 63600175 PHARM REV CODE 636 W HCPCS: Performed by: STUDENT IN AN ORGANIZED HEALTH CARE EDUCATION/TRAINING PROGRAM

## 2023-08-22 PROCEDURE — 99222 PR INITIAL HOSPITAL CARE,LEVL II: ICD-10-PCS | Mod: ,,, | Performed by: INTERNAL MEDICINE

## 2023-08-22 PROCEDURE — 85025 COMPLETE CBC W/AUTO DIFF WBC: CPT | Performed by: STUDENT IN AN ORGANIZED HEALTH CARE EDUCATION/TRAINING PROGRAM

## 2023-08-22 PROCEDURE — 97162 PT EVAL MOD COMPLEX 30 MIN: CPT

## 2023-08-22 PROCEDURE — 83735 ASSAY OF MAGNESIUM: CPT | Performed by: STUDENT IN AN ORGANIZED HEALTH CARE EDUCATION/TRAINING PROGRAM

## 2023-08-22 PROCEDURE — 36415 COLL VENOUS BLD VENIPUNCTURE: CPT | Performed by: STUDENT IN AN ORGANIZED HEALTH CARE EDUCATION/TRAINING PROGRAM

## 2023-08-22 PROCEDURE — 97166 OT EVAL MOD COMPLEX 45 MIN: CPT

## 2023-08-22 RX ORDER — PSEUDOEPHEDRINE/ACETAMINOPHEN 30MG-500MG
100 TABLET ORAL
Status: COMPLETED | OUTPATIENT
Start: 2023-08-22 | End: 2023-08-22

## 2023-08-22 RX ORDER — POLYETHYLENE GLYCOL 3350, SODIUM SULFATE ANHYDROUS, SODIUM BICARBONATE, SODIUM CHLORIDE, POTASSIUM CHLORIDE 236; 22.74; 6.74; 5.86; 2.97 G/4L; G/4L; G/4L; G/4L; G/4L
4000 POWDER, FOR SOLUTION ORAL ONCE
Status: COMPLETED | OUTPATIENT
Start: 2023-08-22 | End: 2023-08-22

## 2023-08-22 RX ORDER — SYRING-NEEDL,DISP,INSUL,0.3 ML 29 G X1/2"
296 SYRINGE, EMPTY DISPOSABLE MISCELLANEOUS
Status: COMPLETED | OUTPATIENT
Start: 2023-08-22 | End: 2023-08-22

## 2023-08-22 RX ADMIN — ONDANSETRON 4 MG: 2 INJECTION INTRAMUSCULAR; INTRAVENOUS at 10:08

## 2023-08-22 RX ADMIN — SODIUM CHLORIDE, SODIUM LACTATE, POTASSIUM CHLORIDE, CALCIUM CHLORIDE AND DEXTROSE MONOHYDRATE: 5; 600; 310; 30; 20 INJECTION, SOLUTION INTRAVENOUS at 08:08

## 2023-08-22 RX ADMIN — MORPHINE SULFATE 2 MG: 2 INJECTION, SOLUTION INTRAMUSCULAR; INTRAVENOUS at 01:08

## 2023-08-22 RX ADMIN — MORPHINE SULFATE 2 MG: 2 INJECTION, SOLUTION INTRAMUSCULAR; INTRAVENOUS at 10:08

## 2023-08-22 RX ADMIN — MORPHINE SULFATE 2 MG: 2 INJECTION, SOLUTION INTRAMUSCULAR; INTRAVENOUS at 06:08

## 2023-08-22 RX ADMIN — Medication 100 ML: at 05:08

## 2023-08-22 RX ADMIN — BE HEALTH MAGNESIUM CITRATE ORAL SOLUTION - LEMON 296 ML: 1.75 LIQUID ORAL at 05:08

## 2023-08-22 RX ADMIN — METHYLNALTREXONE BROMIDE 8 MG: 8 INJECTION, SOLUTION SUBCUTANEOUS at 06:08

## 2023-08-22 RX ADMIN — MORPHINE SULFATE 2 MG: 2 INJECTION, SOLUTION INTRAMUSCULAR; INTRAVENOUS at 08:08

## 2023-08-22 RX ADMIN — POLYETHYLENE GLYCOL 3350, SODIUM SULFATE ANHYDROUS, SODIUM BICARBONATE, SODIUM CHLORIDE, POTASSIUM CHLORIDE 4000 ML: 236; 22.74; 6.74; 5.86; 2.97 POWDER, FOR SOLUTION ORAL at 12:08

## 2023-08-22 RX ADMIN — ENOXAPARIN SODIUM 40 MG: 40 INJECTION SUBCUTANEOUS at 04:08

## 2023-08-22 NOTE — ASSESSMENT & PLAN NOTE
Abdominal film showed no dilated loops of small bowel.  This likely represents narcotic induced constipation.      Colyte via NG tube.      Recommend GI consult for narcotic induced constipation.      Follow-up x-rays tomorrow    If the patient has multiple bowel movements with the colyte then NG tube can be removed and clear liquids can be started

## 2023-08-22 NOTE — PLAN OF CARE
OT ritika completed. Sup>sit min A, sit>stand min A, step>pivot to bedside chair with min HHA of 2. Recommending HHOT at d/c.

## 2023-08-22 NOTE — PROGRESS NOTES
[facility] ([facility]  Progress Note    Patient Name: Jessenia Pa  MRN: [id number]  Patient Class: OP- Observation   Admission Date: 8/20/2023  Length of Stay: 0 days  Attending Physician: Hay Rojas,*  Primary Care Provider: Ana Brown MD        Subjective:     Principal Problem:SBO (small bowel obstruction)        HPI:  Ms. Wallis is an 82-year-old female with past medical history of hypertension, GERD, Raynauds D, insomnia, osteoporosis, ankle surgery 1977, cholecystectomy 2020, ventral hernia repair 2022,- recent lumbar disc surgery on August 11, 2023, (for chronic intractable low back pain, underwent at [facility]) presented to ED due to complaints of nausea, abdominal pain, discomfort, constipation over past couple of days, which got worsened; At home she was taking Percocet q.4 hours.  She began to have abdominal discomfort and bowel distention and nausea and vomiting.  She is not had any bowel movements for the last 6 days.  Not sure if she was taking stool softners or not; denied fever, chills, chest pain, burning micturition.  Stated that she last passed flatus 3 days ago.  She presented to ED, CT scan was done with oral contrast and there was concern for a bowel obstruction versus a ileus.          Patient appeared alert and oriented, currently with NG tube at the time of examination, with approximately 600 cc output so far; general surgery consulted;     Code status-full code   Ambulates at home without assistance;            Overview/Hospital Course:  Ms. Wallis is an 82-year-old female with past medical history of hypertension, GERD, Raynauds D, insomnia, osteoporosis, ankle surgery 1977, cholecystectomy 2020, ventral hernia repair 2022,- recent lumbar disc surgery on August 11, 2023, (for chronic intractable low back pain, underwent at [facility]) presented to ED due to complaints of nausea, abdominal pain, discomfort, constipation over  past couple of days, which got worsened; At home she was taking Percocet q.4 hours.  She began to have abdominal discomfort and bowel distention and nausea and vomiting.  She is not had any bowel movements for the last 6 days.  Not sure if she was taking stool softners or not; denied fever, chills, chest pain, burning micturition.  Stated that she last passed flatus 3 days ago.  She presented to ED, CT scan was done with oral contrast and there was concern for a bowel obstruction versus a ileus.           Patient appeared alert and oriented, currently with NG tube at the time of examination, with approximately 600 cc output so far; general surgery consulted;     On 08/22, x-ray abdomen showed normal bowel pattern, general surgery recommended colitis NG tube, monitor for return of bowel movement, if patient has bowel movement then recommended to discontinue NG tube and initiate clear liquid diet accordingly, recommended GI on board.      Interval History:     No acute events overnight   Hemodynamically stable   Stated having small amounts of flatus 2-3 times yesterday, did not have bowel movements so far.    NG tube with bilious output.    We will follow up on bowel return, general surgery, GI.    Will continue PT/OT        Review of Systems       Constitutional:  Negative for activity change, fatigue and fever.   HENT:  Negative for congestion, ear pain, facial swelling, nosebleeds, sinus pressure and sore throat.    Eyes:  Negative for pain, discharge, redness and visual disturbance.   Respiratory:  Negative for cough, choking, chest tightness, shortness of breath and wheezing.    Cardiovascular:  Negative for chest pain, palpitations and leg swelling.   Gastrointestinal:  Positive for constipation; Negative for abdominal distention, abdominal pain and nausea.   Endocrine: Negative for heat intolerance, polydipsia and polyuria.   Genitourinary:  Negative for difficulty urinating, dysuria, flank pain, hematuria and  urgency.   Musculoskeletal:  Positive for back pain. Negative for gait problem, joint swelling and myalgias.   Skin:  Negative for color change and rash.   Allergic/Immunologic: Negative for environmental allergies and food allergies.   Neurological:  Negative for dizziness, weakness, numbness and headaches.   Hematological:  Negative for adenopathy. Does not bruise/bleed easily.   Psychiatric/Behavioral:  Negative for agitation and behavioral problems. The patient is not nervous/anxious.    Objective:     Vital Signs (Most Recent):  Temp: 98.3 °F (36.8 °C) (08/22/23 0722)  Pulse: 60 (08/22/23 0722)  Resp: 17 (08/22/23 1051)  BP: (!) 142/62 (08/22/23 1050)  SpO2: 100 % (08/22/23 1050) Vital Signs (24h Range):  Temp:  [97.6 °F (36.4 °C)-98.6 °F (37 °C)] 98.3 °F (36.8 °C)  Pulse:  [55-86] 60  Resp:  [16-20] 17  SpO2:  [98 %-100 %] 100 %  BP: (136-167)/(62-70) 142/62     Weight: 57 kg (125 lb 10.6 oz)  Body mass index is 22.98 kg/m².    Intake/Output Summary (Last 24 hours) at 8/22/2023 1134  Last data filed at 8/22/2023 0608  Gross per 24 hour   Intake 835.9 ml   Output 950 ml   Net -114.1 ml         Physical Exam           Constitutional: She appears well-developed and well-nourished. She is not diaphoretic. No distress.   HENT:   Head: Normocephalic and atraumatic.   Mouth/Throat: No oropharyngeal exudate.   Eyes: Conjunctivae and EOM are normal. Pupils are equal, round, and reactive to light. Right eye exhibits no discharge. Left eye exhibits no discharge. No scleral icterus.   Neck: Neck supple. No thyromegaly present. No tracheal deviation present. No JVD present.   Normal range of motion.  Cardiovascular:  Normal rate, regular rhythm, normal heart sounds and intact distal pulses.     Exam reveals no gallop and no friction rub.       No murmur heard.  Pulmonary/Chest: Breath sounds normal. No stridor. No respiratory distress. She has no wheezes. She has no rhonchi. She has no rales. She exhibits no tenderness.    Abdominal: Abdomen is soft. Bowel sounds are normal. She exhibits no distension and no mass. There is no abdominal tenderness. There is no rebound and no guarding.   Musculoskeletal:         General: No tenderness or edema.      Cervical back: Normal range of motion and neck supple.      Comments: Back surgical wound not undressed      Neurological: She is alert and oriented to person, place, and time. She has normal strength.   Skin: Skin is warm and dry. No rash and no abscess noted. No erythema.   Psychiatric: She has a normal mood and affect. Her behavior is normal. Judgment and thought content normal.   Significant Labs: All pertinent labs within the past 24 hours have been reviewed.  CBC:   Recent Labs   Lab 08/20/23 2254 08/22/23  0505   WBC 13.12* 11.64   HGB 11.8* 9.3*   HCT 34.9* 27.9*    292     CMP:   Recent Labs   Lab 08/20/23 2254 08/22/23  0505    140   K 4.0 3.6   CL 92* 98   CO2 36* 34*   * 105   BUN 15 13   CREATININE 1.1 0.7   CALCIUM 10.4 8.9   PROT 6.7  --    ALBUMIN 3.3*  --    BILITOT 0.4  --    ALKPHOS 89  --    AST 24  --    ALT 16  --    ANIONGAP 14 8       Significant Imaging:     Imaging Results              CT Abdomen Pelvis  Without Contrast (Final result)  Result time 08/21/23 04:46:16      Final result by Tom Aldana Jr., MD (08/21/23 04:46:16)                   Impression:        1.  Partial small bowel obstruction versus ileus.  Consider in NG tube for decompression.  No specific transition point.  2.  Foci of gas within the bladder.  Correlate with recent Allison catheter placement versus cystitis.      All CT scans at [this location] are performed using dose modulation techniques as appropriate to a performed exam including the following: automated exposure control; adjustment of the mA and/or kV according to patient size (this includes techniques or standardized protocols for targeted exams where dose is matched to indication / reason for exam; i.e.  extremities or head); use of iterative reconstruction technique.    Finalized on: 8/21/2023 4:46 AM By:  Tom Aldana MD  [facility]# [id number]      2023-08-21 04:49:29.619    [facility]               Narrative:    EXAM: CT ABDOMEN PELVIS WITHOUT CONTRAST    CLINICAL HISTORY: Abdominal pain and distention.    COMPARISON: 04/15/2022.    TECHNIQUE: Axial CT imaging through the abdomen and pelvis performed without intravenous contrast are submitted for interpretation. Multiplanar reformats were performed and interpreted.    FINDINGS:    Abdomen:  Lung bases are clear.  The liver is normal in size and contour without focal mass.  No hydronephrosis.  No solid renal mass identified.  The spleen, adrenal glands, and pancreas are within normal limits.  Gallbladder surgically absent.    Trace free fluid in the pelvis. The stomach and small bowel are distended with air-fluid levels.  Maximum diameter of the small bowel is 3.8 cm in the right lower quadrant of the abdomen.  Decompressed ileum in the right lower quadrant of the abdomen.  Specifically abrupt transition point.  The brachial transition to normal caliber small bowel within the midabdomen.No abdominal lymphadenopathy.      Aorta caliber is normal.    Pelvis   Few gas bubbles within otherwise unremarkable urinary bladder.  Appendix not visualized.    The bones are intact.  Prior lumbar fusion posteriorly L3/L4.                                           X-Ray Abdomen Flat And Erect (Final result)  Result time 08/20/23 23:50:23      Final result by Carl Clemens MD (08/20/23 23:50:23)                   Impression:      Findings consistent with small-bowel obstruction.  Moderate amount of stool in the colon      Electronically signed by: Carl Clemens  Date:    08/20/2023  Time:    23:50               Narrative:    EXAMINATION:  XR ABDOMEN FLAT AND ERECT    CLINICAL HISTORY:  constipation;    TECHNIQUE:  Flat and erect AP views of the abdomen were  performed.    COMPARISON:  None    FINDINGS:  Spinal hardware.  Right upper quadrant right lower quadrant surgical clips.  Bowel obstruction is identified.  Mild moderate amount of stool in the colon.                                         Assessment/Plan:      * SBO (small bowel obstruction)    Likely narcotic induced   Currently with NG tube   General surgery on board   Recommended to continue NG tube, follow-up on x-ray imaging in a.m., Gastrografin small-bowel follow-through as needed   Pain medications, antiemetics   NPO         Anemia  Denied any overt signs of bleeding  Follow-up on h and h and consider transfusion if hemoglobin less than 7      Leucocytosis  Likely reactive from underlying obstruction   Afebrile   Urine negative   Will hold antibiotics for now, we follow-up on fever,, WBC/monitor for signs of infection and consider antibiotics accordingly    8/22  Afebrile, wbc trended down       Hypertension  Currently NPO, hold medications as of now   Hydralazine p.r.n.      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            VTE Risk Mitigation (From admission, onward)         Ordered     enoxaparin injection 40 mg  Every 24 hours         08/21/23 1424     IP VTE HIGH RISK PATIENT  Once         08/21/23 1211     Place sequential compression device  Until discontinued         08/21/23 1211                Discharge Planning   JANETH:      Code Status: Full Code   Is  the patient medically ready for discharge?:     Reason for patient still in hospital (select all that apply): Patient trending condition and Consult recommendations                     Hay Rojas MD  Department of Hospital Medicine   [facility] ([facility])

## 2023-08-22 NOTE — SUBJECTIVE & OBJECTIVE
Interval History:  Mild abdominal pain.  No nausea.  NG tube with bilious output.  No small-bowel dilatation on abdominal films, Colyte.    Medications:  Continuous Infusions:   dextrose 5% lactated ringers 75 mL/hr at 08/22/23 0608     Scheduled Meds:   enoxparin  40 mg Subcutaneous Q24H (prophylaxis, 1700)    polyethylene glycol  4,000 mL Oral Once     PRN Meds:hydrALAZINE, morphine, morphine, ondansetron, sodium chloride 0.9%     Review of patient's allergies indicates:  No Known Allergies  Objective:     Vital Signs (Most Recent):  Temp: 98.3 °F (36.8 °C) (08/22/23 0722)  Pulse: 60 (08/22/23 0722)  Resp: 19 (08/22/23 0722)  BP: 138/64 (08/22/23 0722)  SpO2: 99 % (08/22/23 0800) Vital Signs (24h Range):  Temp:  [97.6 °F (36.4 °C)-98.6 °F (37 °C)] 98.3 °F (36.8 °C)  Pulse:  [55-88] 60  Resp:  [16-20] 19  SpO2:  [96 %-100 %] 99 %  BP: (136-167)/(62-70) 138/64     Weight: 57 kg (125 lb 10.6 oz)  Body mass index is 22.98 kg/m².    Intake/Output - Last 3 Shifts         08/20 0700  08/21 0659 08/21 0700  08/22 0659 08/22 0700  08/23 0659    I.V. (mL/kg)  835.9 (14.7)     Total Intake(mL/kg)  835.9 (14.7)     Drains  950     Total Output  950     Net  -114.1                     Physical Exam  Constitutional:       Appearance: She is well-developed.   HENT:      Head: Normocephalic.   Eyes:      Pupils: Pupils are equal, round, and reactive to light.   Neck:      Thyroid: No thyromegaly.      Vascular: No JVD.      Trachea: No tracheal deviation.   Cardiovascular:      Rate and Rhythm: Normal rate and regular rhythm.      Heart sounds: Normal heart sounds.   Pulmonary:      Breath sounds: Normal breath sounds. No wheezing.   Abdominal:      General: Abdomen is flat. Bowel sounds are normal. There is no distension.      Palpations: Abdomen is soft. Abdomen is not rigid. There is no mass.      Tenderness: There is no abdominal tenderness (mild). There is no guarding or rebound.   Musculoskeletal:         General: Normal  range of motion.      Right lower leg: No edema.      Left lower leg: No edema.   Lymphadenopathy:      Cervical: No cervical adenopathy.   Skin:     General: Skin is warm and dry.      Findings: No erythema or rash.   Neurological:      Mental Status: She is oriented to person, place, and time.          Significant Labs:  I have reviewed all pertinent lab results within the past 24 hours.  CBC:   Recent Labs   Lab 08/22/23  0505   WBC 11.64   RBC 3.02*   HGB 9.3*   HCT 27.9*      MCV 92   MCH 30.8   MCHC 33.3     BMP:   Recent Labs   Lab 08/22/23  0505         K 3.6   CL 98   CO2 34*   BUN 13   CREATININE 0.7   CALCIUM 8.9   MG 1.8     CMP:   Recent Labs   Lab 08/20/23  2254 08/22/23  0505   * 105   CALCIUM 10.4 8.9   ALBUMIN 3.3*  --    PROT 6.7  --     140   K 4.0 3.6   CO2 36* 34*   CL 92* 98   BUN 15 13   CREATININE 1.1 0.7   ALKPHOS 89  --    ALT 16  --    AST 24  --    BILITOT 0.4  --        Significant Diagnostics:  I have reviewed all pertinent imaging results/findings within the past 24 hours.  Abdominal film    EXAMINATION:  XR ABDOMEN FLAT AND ERECT     CLINICAL HISTORY:  Follow-up after CT scan with oral contrast.  Suspect narcotic induced bowel dysfunction versus small bowel obstruction;     TECHNIQUE:  Flat and erect AP views of the abdomen were performed.     COMPARISON:  Abdominal x-ray, 08/20/2023     FINDINGS:  The NG tube tip is in the body of the stomach.  Oral contrast is seen in the right colon.  No dilated small bowel loops.  Cholecystectomy clips.  PLIF hardware from L3-L4.     Impression:     See above

## 2023-08-22 NOTE — CONSULTS
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 Patient reports recent lumbar spine surgery at outside facility, and reports silk tape dressing caused blistering and skin tears upon removal. She is currently in hospital bed, lying on right side with foam wedge, heels floated with pillow. Bilateral heels intact with no redness or breakdown noted.  Back and sacrum/buttocks assessed. Midline lumbar spine incision noted, well approximated with sutures, mild erythema to suture line, no drainage. Cleansed with saline, patted dry, and left ZACHARY.  Proximal and distal to incision is skin injury consistent with MARSI (Medical adhesive related skin injury) from prior surgical dressing. Open maroon and red blisters noted proximally x 2 areas. Distally, to sacrum, intact maroon blister is noted, again consistent with MARSI from surgical dressing. Cleansed all with saline and patted dry. Noreen wound skin painted with cavilon, and silicone foam dressings applied - recommend change weekly and prn.   Maroon  discoloration is also noted to bilateral lower medial buttock that is consistent with DTPI, right side measures 3x3cm, left side 2x1.5cm. cleansed with saline and patted dry, painted with cavilon and left ZACHARY.  Patient again positioned with foam wedge.   NGT noted to right nare, no redness or breakdown noted surrounding tube.  Recommend apply waffle mattress to hospital bed, reposition frequently with foam wedge.          08/22/23 1115   WOCN Assessment   WOCN Total Time (mins) 30   Visit Date 08/22/23   Visit Time 1115   Consult Type New   WOCN Speciality Wound   Wound pressure;other;At risk for pressure Injury;surgical   Intervention assessed;applied;chart review;coordination of care;team conference;orders   Teaching on-going;complication;discharge   Pressure Injury Prevention    Check Moisture Management Pad Done   Sacral Foam Dressing Apply   Heel protection technique Pillow   Heel preventative measures Peel back dressing/boot, assess skin and reapply   Check Medical Devices Done        Altered Skin Integrity 08/22/23 Thoracic spine Medical adhesive related skin injury   Date First Assessed: 08/22/23   Altered Skin Integrity Present on Admission - Did Patient arrive to the hospital with altered skin?: yes  Location: Thoracic spine  Primary Wound Type: Medical adhesive related skin injury   Wound Image    Dressing Appearance Open to air   Drainage Amount Scant   Drainage Characteristics/Odor Serous   Appearance Pink;Red;Maroon;Blistered   Tissue loss description Partial thickness   Periwound Area Intact   Wound Length (cm) 1 cm   Wound Width (cm) 4 cm   Wound Depth (cm) 0.1 cm   Wound Volume (cm^3) 0.4 cm^3   Wound Surface Area (cm^2) 4 cm^2   Care Cleansed with:;Sterile normal saline;Applied:;Skin Barrier   Dressing Foam;Silicone;Applied   Dressing Change Due 08/29/23        Altered Skin Integrity 08/22/23 Sacral spine Medical adhesive related skin injury   Date First Assessed: 08/22/23   Altered Skin Integrity  Present on Admission - Did Patient arrive to the hospital with altered skin?: yes  Location: Sacral spine  Primary Wound Type: Medical adhesive related skin injury   Wound Image    Dressing Appearance Open to air   Drainage Amount None   Appearance Pink;Red;Maroon;Blistered   Tissue loss description Not applicable   Wound Edges Defined   Wound Length (cm) 1.5 cm   Wound Width (cm) 4 cm   Wound Surface Area (cm^2) 6 cm^2   Care Cleansed with:;Sterile normal saline;Applied:;Skin Barrier   Dressing Foam;Silicone;Applied   Dressing Change Due 08/29/23        Altered Skin Integrity 08/22/23  medial;lower Buttocks Purple or maroon localized area of discolored intact skin or non-intact skin or a blood-filled blister.   Date First Assessed: 08/22/23   Altered Skin Integrity Present on Admission - Did Patient arrive to the hospital with altered skin?: yes  Side: (c)   Orientation: medial;lower  Location: Buttocks  Description of Altered Skin Integrity: Purple or maroo...   Wound Image   (see Sacral MARSI LDA for photo)   Description of Altered Skin Integrity Purple or maroon localized area of discolored intact skin or non-intact skin or a blood-filled blister.   Dressing Appearance Open to air   Drainage Amount None   Appearance Maroon   Tissue loss description Not applicable   Periwound Area Intact   Wound Edges Defined   Wound Length (cm) 3 cm   Wound Width (cm) 3 cm   Wound Surface Area (cm^2) 9 cm^2   Care Cleansed with:;Sterile normal saline;Applied:;Skin Barrier        Incision/Site 08/22/23 Lumbar spine vertical   Date First Assessed: 08/22/23   Present Prior to Hospital Arrival?: Yes  Location: Lumbar spine  Incision Type: vertical  Closure Method: Sutures   Wound Image   (see thoracic spine MARSI LDA for photo)   Incision WDL WDL   Dressing Appearance Open to air   Drainage Amount None   Appearance Sutures intact   Care Cleansed with:;Sterile normal saline         08/22/2023

## 2023-08-22 NOTE — HPI
82 y.o female with recent spinal surgery and pain treated with opiates was admitted yesterday with nausea, abdominal pain and constipation. Her partner indicates her last bowel movement was Sunday night and was small. CT was initially concerning for bowel obstruction however PO contrast reached the right colon. Patient seen this afternoon at the request of general surgery. Nasogastric tube was placed to administer gallon of golytely. She received 500cc and the NG tube was clamped. She tolerated the volume however there was some nausea, no vomiting. No other modalities have been tried to aid her constipation. Partner at bedside. States patient's last colonoscopy was 5 years ago with Dr. Green at [facility].

## 2023-08-22 NOTE — HOSPITAL COURSE
Ms. Wallis is an 82-year-old female with past medical history of hypertension, GERD, Raynauds D, insomnia, osteoporosis, ankle surgery 1977, cholecystectomy 2020, ventral hernia repair 2022,- recent lumbar disc surgery on August 11, 2023, (for chronic intractable low back pain, underwent at [facility]) presented to ED due to complaints of nausea, abdominal pain, discomfort, constipation over past couple of days, which got worsened; At home she was taking Percocet q.4 hours.  She began to have abdominal discomfort and bowel distention and nausea and vomiting.  She is not had any bowel movements for the last 6 days.  Not sure if she was taking stool softners or not; denied fever, chills, chest pain, burning micturition.  Stated that she last passed flatus 3 days ago.  She presented to ED, CT scan was done with oral contrast and there was concern for a bowel obstruction versus a ileus.           Patient appeared alert and oriented, currently with NG tube at the time of examination, with approximately 600 cc output so far; general surgery consulted;     On 08/22, x-ray abdomen showed normal bowel pattern, general surgery recommended colitis NG tube, monitor for return of bowel movement, if patient has bowel movement then recommended to discontinue NG tube and initiate clear liquid diet accordingly, recommended GI on board.- GI recommended Brown bomb enema; Relistor 8mg SC QOD x 3 doses    On 08/23, patient noted to have 2-3 episodes of bowel movements, discussed with General surgery, recommended 500 mL of Colyte through her NG tube.  Followed by removal of NG tube, initiation of diet as per GI;     8/24- episode of bowel movement overnight, able to tolerate clear liquid diet, denied nausea, vomiting, abdominal pain.  We will advance diet to soft diet, we will follow up with General surgery.  PT OT evaluated and recommended home health    8/25  Examination done at bedside, patient appeared alert and oriented x3,  denied acute issues overnight, denied headache, dizziness, chest pain, shortness O breath, nausea, vomiting, bowel or bladder issues.    Advance diet yesterday, able to tolerate diet without issues, denied abdominal pain, nausea, vomiting, continues to have bowel movements.    No abdominal distention noted.    Considering clinical and hemodynamic stability, planning to discharge patient today, with home health orders, with stool softness, emphasized on side effects of pain medications, emphasized on stool softeners, diet, therapy- agreed to the plan, updated plan of care to patient's caretaker, agreed.  Discharge today, ordered home health, medications to be delivered at bedside.

## 2023-08-22 NOTE — ASSESSMENT & PLAN NOTE
Xray/ CT imaging with findings suggestive of SBO vs ileus  Currently with NG tube   General surgery on board   Recommended to continue NG tube, follow-up on x-ray imaging in a.m., Gastrografin small-bowel follow-through as needed   Pain medications, antiemetics   NPO     8/22    x-ray abdomen showed normal bowel pattern, general surgery recommended colitis NG tube, monitor for return of bowel movement, if patient has bowel movement then recommended to discontinue NG tube and initiate clear liquid diet accordingly, recommended GI on board.

## 2023-08-22 NOTE — PLAN OF CARE
P.T. EVAL COMPLETE.  PT CURRENTLY REQUIRES [facility] FOR BED MOBILITY, TF'S, AND PRE-GAIT ACTIVITY.  P.T. RECOMMENDS HHPT AT D/C WITH CONT. 24 HOUR CARE

## 2023-08-22 NOTE — SUBJECTIVE & OBJECTIVE
Interval History:     No acute events overnight   Hemodynamically stable   Stated having small amounts of flatus 2-3 times yesterday, did not have bowel movements so far.    NG tube with bilious output.    We will follow up on bowel return, general surgery, GI.    Will continue PT/OT        Review of Systems       Constitutional:  Negative for activity change, fatigue and fever.   HENT:  Negative for congestion, ear pain, facial swelling, nosebleeds, sinus pressure and sore throat.    Eyes:  Negative for pain, discharge, redness and visual disturbance.   Respiratory:  Negative for cough, choking, chest tightness, shortness of breath and wheezing.    Cardiovascular:  Negative for chest pain, palpitations and leg swelling.   Gastrointestinal:  Positive for constipation; Negative for abdominal distention, abdominal pain and nausea.   Endocrine: Negative for heat intolerance, polydipsia and polyuria.   Genitourinary:  Negative for difficulty urinating, dysuria, flank pain, hematuria and urgency.   Musculoskeletal:  Positive for back pain. Negative for gait problem, joint swelling and myalgias.   Skin:  Negative for color change and rash.   Allergic/Immunologic: Negative for environmental allergies and food allergies.   Neurological:  Negative for dizziness, weakness, numbness and headaches.   Hematological:  Negative for adenopathy. Does not bruise/bleed easily.   Psychiatric/Behavioral:  Negative for agitation and behavioral problems. The patient is not nervous/anxious.    Objective:     Vital Signs (Most Recent):  Temp: 98.3 °F (36.8 °C) (08/22/23 0722)  Pulse: 60 (08/22/23 0722)  Resp: 17 (08/22/23 1051)  BP: (!) 142/62 (08/22/23 1050)  SpO2: 100 % (08/22/23 1050) Vital Signs (24h Range):  Temp:  [97.6 °F (36.4 °C)-98.6 °F (37 °C)] 98.3 °F (36.8 °C)  Pulse:  [55-86] 60  Resp:  [16-20] 17  SpO2:  [98 %-100 %] 100 %  BP: (136-167)/(62-70) 142/62     Weight: 57 kg (125 lb 10.6 oz)  Body mass index is 22.98  kg/m².    Intake/Output Summary (Last 24 hours) at 8/22/2023 1134  Last data filed at 8/22/2023 0608  Gross per 24 hour   Intake 835.9 ml   Output 950 ml   Net -114.1 ml         Physical Exam           Constitutional: She appears well-developed and well-nourished. She is not diaphoretic. No distress.   HENT:   Head: Normocephalic and atraumatic.   Mouth/Throat: No oropharyngeal exudate.   Eyes: Conjunctivae and EOM are normal. Pupils are equal, round, and reactive to light. Right eye exhibits no discharge. Left eye exhibits no discharge. No scleral icterus.   Neck: Neck supple. No thyromegaly present. No tracheal deviation present. No JVD present.   Normal range of motion.  Cardiovascular:  Normal rate, regular rhythm, normal heart sounds and intact distal pulses.     Exam reveals no gallop and no friction rub.       No murmur heard.  Pulmonary/Chest: Breath sounds normal. No stridor. No respiratory distress. She has no wheezes. She has no rhonchi. She has no rales. She exhibits no tenderness.   Abdominal: Abdomen is soft. Bowel sounds are normal. She exhibits no distension and no mass. There is no abdominal tenderness. There is no rebound and no guarding.   Musculoskeletal:         General: No tenderness or edema.      Cervical back: Normal range of motion and neck supple.      Comments: Back surgical wound not undressed      Neurological: She is alert and oriented to person, place, and time. She has normal strength.   Skin: Skin is warm and dry. No rash and no abscess noted. No erythema.   Psychiatric: She has a normal mood and affect. Her behavior is normal. Judgment and thought content normal.   Significant Labs: All pertinent labs within the past 24 hours have been reviewed.  CBC:   Recent Labs   Lab 08/20/23 2254 08/22/23  0505   WBC 13.12* 11.64   HGB 11.8* 9.3*   HCT 34.9* 27.9*    292     CMP:   Recent Labs   Lab 08/20/23 2254 08/22/23  0505    140   K 4.0 3.6   CL 92* 98   CO2 36* 34*   GLU  149* 105   BUN 15 13   CREATININE 1.1 0.7   CALCIUM 10.4 8.9   PROT 6.7  --    ALBUMIN 3.3*  --    BILITOT 0.4  --    ALKPHOS 89  --    AST 24  --    ALT 16  --    ANIONGAP 14 8       Significant Imaging:     Imaging Results              CT Abdomen Pelvis  Without Contrast (Final result)  Result time 08/21/23 04:46:16      Final result by Tom Aldana Jr., MD (08/21/23 04:46:16)                   Impression:        1.  Partial small bowel obstruction versus ileus.  Consider in NG tube for decompression.  No specific transition point.  2.  Foci of gas within the bladder.  Correlate with recent Allison catheter placement versus cystitis.      All CT scans at [this location] are performed using dose modulation techniques as appropriate to a performed exam including the following: automated exposure control; adjustment of the mA and/or kV according to patient size (this includes techniques or standardized protocols for targeted exams where dose is matched to indication / reason for exam; i.e. extremities or head); use of iterative reconstruction technique.    Finalized on: 8/21/2023 4:46 AM By:  Tom Aldana MD  [facility]# [id number]      2023-08-21 04:49:29.619    [facility]               Narrative:    EXAM: CT ABDOMEN PELVIS WITHOUT CONTRAST    CLINICAL HISTORY: Abdominal pain and distention.    COMPARISON: 04/15/2022.    TECHNIQUE: Axial CT imaging through the abdomen and pelvis performed without intravenous contrast are submitted for interpretation. Multiplanar reformats were performed and interpreted.    FINDINGS:    Abdomen:  Lung bases are clear.  The liver is normal in size and contour without focal mass.  No hydronephrosis.  No solid renal mass identified.  The spleen, adrenal glands, and pancreas are within normal limits.  Gallbladder surgically absent.    Trace free fluid in the pelvis. The stomach and small bowel are distended with air-fluid levels.  Maximum diameter of the small bowel is 3.8 cm in the right lower  quadrant of the abdomen.  Decompressed ileum in the right lower quadrant of the abdomen.  Specifically abrupt transition point.  The brachial transition to normal caliber small bowel within the midabdomen.No abdominal lymphadenopathy.      Aorta caliber is normal.    Pelvis   Few gas bubbles within otherwise unremarkable urinary bladder.  Appendix not visualized.    The bones are intact.  Prior lumbar fusion posteriorly L3/L4.                                           X-Ray Abdomen Flat And Erect (Final result)  Result time 08/20/23 23:50:23      Final result by Carl Clemens MD (08/20/23 23:50:23)                   Impression:      Findings consistent with small-bowel obstruction.  Moderate amount of stool in the colon      Electronically signed by: Carl Clemens  Date:    08/20/2023  Time:    23:50               Narrative:    EXAMINATION:  XR ABDOMEN FLAT AND ERECT    CLINICAL HISTORY:  constipation;    TECHNIQUE:  Flat and erect AP views of the abdomen were performed.    COMPARISON:  None    FINDINGS:  Spinal hardware.  Right upper quadrant right lower quadrant surgical clips.  Bowel obstruction is identified.  Mild moderate amount of stool in the colon.

## 2023-08-22 NOTE — PROGRESS NOTES
[facility])  General Surgery  Progress Note    Subjective:     History of Present Illness:  [age] Year old female who underwent a lower back surgery on August 11th 2023.  For intractable lower back pain.  Done at the [facility]. .  She was hospitalized for several is postoperatively and discharged home.  At home she was taking Percocet q.4 hours.  She began to have abdominal discomfort and bowel distention and nausea and vomiting.  She is not had any bowel movements for the last 6 days.  She presented to CT scan was done with oral contrast and there was concern for a bowel obstruction versus a ileus.          Post-Op Info:  * No surgery found *         Interval History:  Mild abdominal pain.  No nausea.  NG tube with bilious output.  No small-bowel dilatation on abdominal films, Colyte.    Medications:  Continuous Infusions:   dextrose 5% lactated ringers 75 mL/hr at 08/22/23 0608     Scheduled Meds:   enoxparin  40 mg Subcutaneous Q24H (prophylaxis, 1700)    polyethylene glycol  4,000 mL Oral Once     PRN Meds:hydrALAZINE, morphine, morphine, ondansetron, sodium chloride 0.9%     Review of patient's allergies indicates:  No Known Allergies  Objective:     Vital Signs (Most Recent):  Temp: 98.3 °F (36.8 °C) (08/22/23 0722)  Pulse: 60 (08/22/23 0722)  Resp: 19 (08/22/23 0722)  BP: 138/64 (08/22/23 0722)  SpO2: 99 % (08/22/23 0800) Vital Signs (24h Range):  Temp:  [97.6 °F (36.4 °C)-98.6 °F (37 °C)] 98.3 °F (36.8 °C)  Pulse:  [55-88] 60  Resp:  [16-20] 19  SpO2:  [96 %-100 %] 99 %  BP: (136-167)/(62-70) 138/64     Weight: 57 kg (125 lb 10.6 oz)  Body mass index is 22.98 kg/m².    Intake/Output - Last 3 Shifts         08/20 0700 08/21 0659 08/21 0700 08/22 0659 08/22 0700 08/23 0659    I.V. (mL/kg)  835.9 (14.7)     Total Intake(mL/kg)  835.9 (14.7)     Drains  950     Total Output  950     Net  -114.1                     Physical Exam  Constitutional:       Appearance: She is  well-developed.   HENT:      Head: Normocephalic.   Eyes:      Pupils: Pupils are equal, round, and reactive to light.   Neck:      Thyroid: No thyromegaly.      Vascular: No JVD.      Trachea: No tracheal deviation.   Cardiovascular:      Rate and Rhythm: Normal rate and regular rhythm.      Heart sounds: Normal heart sounds.   Pulmonary:      Breath sounds: Normal breath sounds. No wheezing.   Abdominal:      General: Abdomen is flat. Bowel sounds are normal. There is no distension.      Palpations: Abdomen is soft. Abdomen is not rigid. There is no mass.      Tenderness: There is no abdominal tenderness (mild). There is no guarding or rebound.   Musculoskeletal:         General: Normal range of motion.      Right lower leg: No edema.      Left lower leg: No edema.   Lymphadenopathy:      Cervical: No cervical adenopathy.   Skin:     General: Skin is warm and dry.      Findings: No erythema or rash.   Neurological:      Mental Status: She is oriented to person, place, and time.          Significant Labs:  I have reviewed all pertinent lab results within the past 24 hours.  CBC:   Recent Labs   Lab 08/22/23  0505   WBC 11.64   RBC 3.02*   HGB 9.3*   HCT 27.9*      MCV 92   MCH 30.8   MCHC 33.3     BMP:   Recent Labs   Lab 08/22/23  0505         K 3.6   CL 98   CO2 34*   BUN 13   CREATININE 0.7   CALCIUM 8.9   MG 1.8     CMP:   Recent Labs   Lab 08/20/23  2254 08/22/23  0505   * 105   CALCIUM 10.4 8.9   ALBUMIN 3.3*  --    PROT 6.7  --     140   K 4.0 3.6   CO2 36* 34*   CL 92* 98   BUN 15 13   CREATININE 1.1 0.7   ALKPHOS 89  --    ALT 16  --    AST 24  --    BILITOT 0.4  --        Significant Diagnostics:  I have reviewed all pertinent imaging results/findings within the past 24 hours.  Abdominal film    EXAMINATION:  XR ABDOMEN FLAT AND ERECT     CLINICAL HISTORY:  Follow-up after CT scan with oral contrast.  Suspect narcotic induced bowel dysfunction versus small bowel  obstruction;     TECHNIQUE:  Flat and erect AP views of the abdomen were performed.     COMPARISON:  Abdominal x-ray, 08/20/2023     FINDINGS:  The NG tube tip is in the body of the stomach.  Oral contrast is seen in the right colon.  No dilated small bowel loops.  Cholecystectomy clips.  PLIF hardware from L3-L4.     Impression:     See above       Assessment/Plan:     * SBO (small bowel obstruction)  Abdominal film showed no dilated loops of small bowel.  This likely represents narcotic induced constipation.      Colyte via NG tube.      Recommend GI consult for narcotic induced constipation.      Follow-up x-rays tomorrow    If the patient has multiple bowel movements with the colyte then NG tube can be removed and clear liquids can be started    Constipation due to opioid therapy  Colyte via NG tube.      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  Status   NG tube wall suction  Ice chips permissible   Check abdominal x-rays in the morning.      See small-bowel obstruction        Ludwig Aguila MD  General Surgery  [facility] - Telemetry ([facility])

## 2023-08-22 NOTE — ASSESSMENT & PLAN NOTE
Likely reactive from underlying obstruction   Afebrile   Urine negative   Will hold antibiotics for now, we follow-up on fever,, WBC/monitor for signs of infection and consider antibiotics accordingly    8/22  Afebrile, wbc trended down

## 2023-08-22 NOTE — PT/OT/SLP EVAL
Physical Therapy Evaluation    Patient Name:  Jessenia Pa   MRN:  [id number]    Recommendations:     Discharge Recommendations: home health PT (WITH CONT. 24 HOUR CARE)   Discharge Equipment Recommendations: none   Barriers to discharge: None    Assessment:     Jessenia Pa is a 82 y.o. female admitted with a medical diagnosis of SBO (small bowel obstruction).  She presents with the following impairments/functional limitations: weakness, impaired endurance, impaired functional mobility, gait instability, impaired balance, pain, decreased safety awareness, impaired cardiopulmonary response to activity.    Rehab Prognosis: Good; patient would benefit from acute skilled PT services to address these deficits and reach maximum level of function.    Recent Surgery: * No surgery found *     Plan:     During this hospitalization, patient to be seen 3 x/week to address the identified rehab impairments via gait training, therapeutic activities, therapeutic exercises and progress toward the following goals:    Plan of Care Expires:  09/05/23    Subjective     Chief Complaint: WEAKNESS AND PAIN, R KNEE DISCOMFORT IN WBING  Patient/Family Comments/goals:   Pain/Comfort:  Pain Rating 1: 7/10  Location 1: abdomen  Pain Addressed 1: Reposition, Distraction  Pain Rating 2: 7/10  Location - Side 2: Bilateral  Location - Orientation 2: lower  Location 2: back  Pain Addressed 2: Distraction, Reposition    Patients cultural, spiritual, Sikh conflicts given the current situation:      Living Environment:  PT LIVES ALONE AND WAS INDEP WITH ALL MOBILITY PRIOR TO RECENT BACK SURGERY 8-11-23.  SINCE THEN SHE HAS BEEN LIVING WITH A FRIEND WHO IS ABLE TO PROVIDE 24 HOUR CARE IN 1 STORY HOUSE WITH 2 STEPS TO ENTER NO RAIL, AMB SHORT HOUSEHOLD DISTANCES WITH RW, REQUIRES ASSIST FOR ADL'S, NO HOME O2 PTA  Prior to admission, patients level of function was LUIS WITH MOBILITY.  Equipment used at home: walker, rolling, shower chair.  DME  owned (not currently used): none.  Upon discharge, patient will have assistance from FRIEND.    Objective:     Communicated with NURSE ADY prior to session.  Patient found supine with telemetry, peripheral IV, NG tube, oxygen  upon PT entry to room.    General Precautions: Standard, fall, respiratory  Orthopedic Precautions:spinal precautions   Braces: LSO, SOFT R KNEE BRACE  Respiratory Status: Nasal cannula, flow 4.5 L/min    Exams:  Cognitive Exam:  Patient is oriented to Person, Place, Time, and Situation  Postural Exam:  Patient presented with the following abnormalities:    -       Rounded shoulders  Sensation:    -       Intact  RLE ROM: WFL-PAINFUL AT KNEE WITH AROM  RLE Strength: GROSSLY 4-/5  LLE ROM: WFL  LLE Strength: GROSSLY 4-/5    Functional Mobility:  Bed Mobility:     Rolling Left:  minimum assistance  Scooting: minimum assistance  Supine to Sit: minimum assistance  Transfers:     Sit to Stand:  minimum assistance with no AD and hand-held assist  Bed to Chair: minimum assistance with  no AD and hand-held assist  using  Step Transfer  Gait: PT TOLERATED PRE-GAIT ACTIVITY STANDING IN FRONT OF CHAIR DUE TO NG CONNECTED TO WALL SUCTION, PT PERFORMED MARCHING IN PLACE WITH STANDING REST BREAKS AS NEEDED FOR 5 MINUTES WITH MIN/CGA, GOOD EFFORT, CUES FOR UPRIGHT POSTURE AND DEEP BREATHING, O2 IN TOW  Balance: FAIR SITTING BALANCE, POOR+ STANDING BALANCE    AM-PAC 6 CLICK MOBILITY  Total Score:14     Treatment & Education:  EXTENSIVE PT AND CAREGIVER EDUCATION PROVIDED:  - ROLE OF P.T. AND POC IN ACUTE CARE HOSPITAL SETTING  - RW USE AND SAFETY DURING TF'S AND GAIT  - REVIEW SPINAL PRECAUTIONS, NO BENDING, LIFTING AND TWISTING  - BACK BRACE DONNED WITH MAXA-EDUCATION IN DONING/DOFFING  - ENCOURAGED TO INCREASE TIME OOB IN CHAIR TO TOLERANCE   - ENCOURAGED TO AMBULATE HOURLY AT HOME AS WELL AS SEATED THEREX: HEP PROVIDED TO PT AND CAREGIVER  - TO CONTINUE THERAPUETIC EXERCISES THROUGHOUT THE DAY TO  "INCREASE ACTIVITY TOLERANCE AND DECREASE RISK FOR PNEUMONIA AND BLOOD CLOTS: HIP FLEX/EXT, HIP ABD/ADD, QUAD SET, HEEL SLIDE, AP  - RISK FOR FALLS DUE TO GENERALIZED WEAKNESS, EDUCATED ON "CALL DON'T FALL", ENCOURAGED TO CALL FOR ASSISTANCE WITH ALL NEEDS SUCH AS BED<>CHAIR TRANSFERS OR TRIPS TO BATHROOM, PT AGREEABLE TO SAFETY PRECAUTIONS    Patient left up in chair with all lines intact, call button in reach, chair alarm on, NURSE notified, and CAREGIVER present.    GOALS:   Multidisciplinary Problems       Physical Therapy Goals          Problem: Physical Therapy    Goal Priority Disciplines Outcome Goal Variances Interventions   Physical Therapy Goal     PT, PT/OT      Description: LTG'S TO BE MET IN 14 DAYS (9-5-23)  PT WILL REQUIRE SBA FOR BED MOBILITY  PT WILL REQUIRE SBA FOR BED<>CHAIR TF'S  PT WILL  FEET WITH RW AND SBA  PT WILL INC AMPAC SCORE BY 2 POINTS TO PROGRESS GROSS FUNC MOBILITY                         History:     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

## 2023-08-22 NOTE — PT/OT/SLP EVAL
"Occupational Therapy Evaluation and Treatment    Name: Jessenia Pa  MRN: [id number]  Admitting Diagnosis: SBO (small bowel obstruction)  Recent Surgery: * No surgery found *      Recommendations:     Discharge Recommendations: home health OT (24/7 SPV and A)  Level of Assistance Recommended: 24 hours significant assistance  Discharge Equipment Recommendations: none  Barriers to discharge: None    Assessment:     Jessenia Pa is a 82 y.o. female with a medical diagnosis of SBO (small bowel obstruction). She presents with performance deficits affecting function including weakness, impaired endurance, impaired self care skills, impaired functional mobility, impaired balance, pain, impaired cardiopulmonary response to activity, orthopedic precautions, impaired fine motor.    Rehab Prognosis: Good; patient would benefit from acute OT services to address these deficits and reach maximum level of function.    Plan:     Patient to be seen 2 x/week to address the above listed problems via self-care/home management, therapeutic exercises, therapeutic activities  Plan of Care Expires: 09/05/23  Plan of Care Reviewed with: patient, friend    Subjective     Chief Complaint: Reported "I am doing a little better."  Patient Comments/Goals: go home  Pain/Comfort:  Pain Rating 1: 7/10  Location 1: abdomen  Pain Addressed 1:  (activity pacing)    Social History:  Living Environment: Patient lives alone in a single story home with number of outside stair(s): 2 steps with rail. Has been staying with friend since d/c home from back surgery.  Prior Level of Function: Prior to admission, patient was independent with ADLs and community distance ambulation prior to surgery. Has been walking HH distances with RW and required A with ADLs since.  Roles and Routines: Patient was driving and retired prior to surgery.  Equipment Used at Home: walker, rolling, shower chair  DME owned (not currently used): none  Assistance Upon Discharge: " friend(s)    Objective:     Communicated with nurse, Edita, prior to session. Patient found supine with peripheral IV, telemetry, NG tube, oxygen (NG to wall suction) upon OT entry to room.    General Precautions: Standard, fall, respiratory   Orthopedic Precautions: spinal precautions   Braces: LSO    Respiratory Status: Nasal cannula, flow 4.5 L/min    Occupational Performance    Bed Mobility:   Supine to sit from right side of bed with minimum assistance  Completed via log rolling with max verbal and tactile cueing.     Functional Mobility/Transfers:  Sit <> Stand Transfer with minimum assistance with hand-held assist  Bed <> Chair Transfer using Step Transfer technique with minimum assistance and 2 persons with hand-held assist  Functional Mobility: unable to progress away from bedside due to wall suction. Marching in place with min HHA of 2 x5 minutes.    Activities of Daily Living:  Grooming: set up assistance  Upper Body Dressing: maximal assistance donning LSO while seated EOB    Cognitive/Visual Perceptual:  Cognitive/Psychosocial Skills:    -     Oriented to: Person, Place, Time, Situation  -     Follows Commands/attention: Follows one-step commands    Physical Exam:  Balance:    -     Sitting: contact guard assistance  -     Standing: minimum assistance  Upper Extremity Range of Motion:     -       Right Upper Extremity: WFL except shdr to 90* 2/2 spinal precautions  -       Left Upper Extremity: WFL except shdr to 90* 2/2 spinal precautions  Upper Extremity Strength:    -       Right Upper Extremity: Deficits: grossly 4-/5 elbows distally  -       Left Upper Extremity: Deficits: grossly 4-/5 elbows distally   Strength:    -       Right Upper Extremity: Deficits: poor  -       Left Upper Extremity: Deficits: poor  Declines sensation deficits to B UE.    AMPAC 6 Click ADL:  AMPAC Total Score: 14    Treatment & Education:  Therapist provided facilitation and instruction of proper body mechanics, energy  conservation, and fall prevention strategies during tasks listed above  Patient educated on role of OT, POC, and goals for therapy  Patient educated on importance of OOB activities with staff member assistance and sitting OOB majority of the day  Educated on spinal precautions and their functional implications.   Educated on appropriate wear and donning of LSO  Documented precautions on white board to improve communication with nursing.  Encouraged completion of B UE AROM therex, with spinal precautions, throughout the day to increase functional strength and activity tolerance needed for ADL completion.  Increased time for all mobility.     Patient left up in chair with all lines intact, call button in reach, and friend present.    GOALS:   Multidisciplinary Problems       Occupational Therapy Goals          Problem: Occupational Therapy    Goal Priority Disciplines Outcome Interventions   Occupational Therapy Goal     OT, PT/OT     Description: Goals to be met by: 9/5/23     Patient will increase functional independence with ADLs by performing:    Toileting from toilet with Contact Guard Assistance for hygiene and clothing management.   Toilet transfer to toilet with Contact Guard Assistance.  Demonstrates functional compliance with spinal precautions.                         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

## 2023-08-22 NOTE — PLAN OF CARE
Pt had big BM after enema.  Problem: Adult Inpatient Plan of Care  Goal: Plan of Care Review  Outcome: Ongoing, Progressing  Goal: Patient-Specific Goal (Individualized)  Outcome: Ongoing, Progressing  Goal: Absence of Hospital-Acquired Illness or Injury  Outcome: Ongoing, Progressing  Goal: Optimal Comfort and Wellbeing  Outcome: Ongoing, Progressing  Goal: Readiness for Transition of Care  Outcome: Ongoing, Progressing     Problem: Nausea and Vomiting  Goal: Fluid and Electrolyte Balance  Outcome: Ongoing, Progressing     Problem: Pain Acute  Goal: Acceptable Pain Control and Functional Ability  Outcome: Ongoing, Progressing     Problem: Fall Injury Risk  Goal: Absence of Fall and Fall-Related Injury  Outcome: Ongoing, Progressing     Problem: Impaired Wound Healing  Goal: Optimal Wound Healing  Outcome: Ongoing, Progressing

## 2023-08-23 PROBLEM — K56.609 SBO (SMALL BOWEL OBSTRUCTION): Status: RESOLVED | Noted: 2023-08-21 | Resolved: 2023-08-23

## 2023-08-23 LAB
ANION GAP SERPL CALC-SCNC: 11 MMOL/L (ref 8–16)
BASOPHILS # BLD AUTO: 0.04 K/UL (ref 0–0.2)
BASOPHILS NFR BLD: 0.4 % (ref 0–1.9)
BUN SERPL-MCNC: 9 MG/DL (ref 8–23)
CALCIUM SERPL-MCNC: 9.6 MG/DL (ref 8.7–10.5)
CHLORIDE SERPL-SCNC: 100 MMOL/L (ref 95–110)
CO2 SERPL-SCNC: 34 MMOL/L (ref 23–29)
CREAT SERPL-MCNC: 0.7 MG/DL (ref 0.5–1.4)
DIFFERENTIAL METHOD: ABNORMAL
EOSINOPHIL # BLD AUTO: 0.3 K/UL (ref 0–0.5)
EOSINOPHIL NFR BLD: 3.1 % (ref 0–8)
ERYTHROCYTE [DISTWIDTH] IN BLOOD BY AUTOMATED COUNT: 13.2 % (ref 11.5–14.5)
EST. GFR  (NO RACE VARIABLE): >60 ML/MIN/1.73 M^2
GLUCOSE SERPL-MCNC: 119 MG/DL (ref 70–110)
HCT VFR BLD AUTO: 32 % (ref 37–48.5)
HGB BLD-MCNC: 10.4 G/DL (ref 12–16)
IMM GRANULOCYTES # BLD AUTO: 0.08 K/UL (ref 0–0.04)
IMM GRANULOCYTES NFR BLD AUTO: 0.9 % (ref 0–0.5)
LYMPHOCYTES # BLD AUTO: 1.1 K/UL (ref 1–4.8)
LYMPHOCYTES NFR BLD: 12.6 % (ref 18–48)
MAGNESIUM SERPL-MCNC: 1.7 MG/DL (ref 1.6–2.6)
MCH RBC QN AUTO: 29.7 PG (ref 27–31)
MCHC RBC AUTO-ENTMCNC: 32.5 G/DL (ref 32–36)
MCV RBC AUTO: 91 FL (ref 82–98)
MONOCYTES # BLD AUTO: 0.9 K/UL (ref 0.3–1)
MONOCYTES NFR BLD: 9.6 % (ref 4–15)
NEUTROPHILS # BLD AUTO: 6.6 K/UL (ref 1.8–7.7)
NEUTROPHILS NFR BLD: 73.4 % (ref 38–73)
NRBC BLD-RTO: 0 /100 WBC
PLATELET # BLD AUTO: 397 K/UL (ref 150–450)
PLATELET BLD QL SMEAR: ABNORMAL
PMV BLD AUTO: 9.9 FL (ref 9.2–12.9)
POTASSIUM SERPL-SCNC: 3.5 MMOL/L (ref 3.5–5.1)
RBC # BLD AUTO: 3.5 M/UL (ref 4–5.4)
SODIUM SERPL-SCNC: 145 MMOL/L (ref 136–145)
TSH SERPL DL<=0.005 MIU/L-ACNC: 0.74 UIU/ML (ref 0.4–4)
WBC # BLD AUTO: 9 K/UL (ref 3.9–12.7)

## 2023-08-23 PROCEDURE — 84443 ASSAY THYROID STIM HORMONE: CPT | Performed by: INTERNAL MEDICINE

## 2023-08-23 PROCEDURE — 83735 ASSAY OF MAGNESIUM: CPT | Performed by: STUDENT IN AN ORGANIZED HEALTH CARE EDUCATION/TRAINING PROGRAM

## 2023-08-23 PROCEDURE — 99232 PR SUBSEQUENT HOSPITAL CARE,LEVL II: ICD-10-PCS | Mod: ,,, | Performed by: INTERNAL MEDICINE

## 2023-08-23 PROCEDURE — 36415 COLL VENOUS BLD VENIPUNCTURE: CPT | Performed by: INTERNAL MEDICINE

## 2023-08-23 PROCEDURE — 85025 COMPLETE CBC W/AUTO DIFF WBC: CPT | Performed by: STUDENT IN AN ORGANIZED HEALTH CARE EDUCATION/TRAINING PROGRAM

## 2023-08-23 PROCEDURE — 99232 SBSQ HOSP IP/OBS MODERATE 35: CPT | Mod: ,,, | Performed by: INTERNAL MEDICINE

## 2023-08-23 PROCEDURE — 80048 BASIC METABOLIC PNL TOTAL CA: CPT | Performed by: STUDENT IN AN ORGANIZED HEALTH CARE EDUCATION/TRAINING PROGRAM

## 2023-08-23 PROCEDURE — 99233 SBSQ HOSP IP/OBS HIGH 50: CPT | Mod: ,,, | Performed by: SURGERY

## 2023-08-23 PROCEDURE — 21400001 HC TELEMETRY ROOM

## 2023-08-23 PROCEDURE — 94761 N-INVAS EAR/PLS OXIMETRY MLT: CPT

## 2023-08-23 PROCEDURE — 63600175 PHARM REV CODE 636 W HCPCS: Performed by: STUDENT IN AN ORGANIZED HEALTH CARE EDUCATION/TRAINING PROGRAM

## 2023-08-23 PROCEDURE — 99233 PR SUBSEQUENT HOSPITAL CARE,LEVL III: ICD-10-PCS | Mod: ,,, | Performed by: SURGERY

## 2023-08-23 RX ADMIN — ENOXAPARIN SODIUM 40 MG: 40 INJECTION SUBCUTANEOUS at 06:08

## 2023-08-23 RX ADMIN — SODIUM CHLORIDE, SODIUM LACTATE, POTASSIUM CHLORIDE, CALCIUM CHLORIDE AND DEXTROSE MONOHYDRATE: 5; 600; 310; 30; 20 INJECTION, SOLUTION INTRAVENOUS at 09:08

## 2023-08-23 RX ADMIN — MORPHINE SULFATE 2 MG: 2 INJECTION, SOLUTION INTRAMUSCULAR; INTRAVENOUS at 05:08

## 2023-08-23 RX ADMIN — MORPHINE SULFATE 2 MG: 2 INJECTION, SOLUTION INTRAMUSCULAR; INTRAVENOUS at 09:08

## 2023-08-23 RX ADMIN — SODIUM CHLORIDE, SODIUM LACTATE, POTASSIUM CHLORIDE, CALCIUM CHLORIDE AND DEXTROSE MONOHYDRATE: 5; 600; 310; 30; 20 INJECTION, SOLUTION INTRAVENOUS at 06:08

## 2023-08-23 NOTE — ASSESSMENT & PLAN NOTE
Abdominal film showed no dilated loops of small bowel.  This likely represents narcotic induced constipation.    GI consult reviewed  Colyte via NG tube again today  NG tube could then be removed per Gastroenterology  Initiated a diet per Gastroenterology

## 2023-08-23 NOTE — ASSESSMENT & PLAN NOTE
Xray/ CT imaging with findings suggestive of SBO vs ileus  Currently with NG tube   General surgery on board   Recommended to continue NG tube, follow-up on x-ray imaging in a.m., Gastrografin small-bowel follow-through as needed   Pain medications, antiemetics   NPO     8/22    x-ray abdomen showed normal bowel pattern, general surgery recommended colitis NG tube, monitor for return of bowel movement, if patient has bowel movement then recommended to discontinue NG tube and initiate clear liquid diet accordingly, recommended GI on board.    8/23    GI recommended Brown bomb enema; Relistor 8mg SC QOD x 3 doses    On 08/23, patient noted to have 2-3 episodes of bowel movements, discussed with General surgery, recommended 500 mL of Colyte through her NG tube.  Followed by removal of NG tube, initiation of diet as per GI;

## 2023-08-23 NOTE — PROGRESS NOTES
"[facility] ([facility])  Gastroenterology  Progress Note    Patient Name: Jessenia Pa  MRN: [id number]  Admission Date: 8/20/2023  Hospital Length of Stay: 1 days  Code Status: Full Code   Attending Provider: Hay Rojas,*  Consulting Provider: Ivania Mann MD  Primary Care Physician: Ana Brown MD  Principal Problem: SBO (small bowel obstruction)        Subjective:     Interval History: had multiple bowel movements last night. Abdominal discomfort has nearly resolved. TSH normal. NG tube remains in place. Bowel prep being administered. Patient felt "gurgling" in her esophagus. CXR ordered and NG tube tip in stomach.     Review of Systems  Objective:     Vital Signs (Most Recent):  Temp: 98 °F (36.7 °C) (08/23/23 1149)  Pulse: (!) 59 (08/23/23 1149)  Resp: 20 (08/23/23 1149)  BP: (!) 194/80 (08/23/23 1149)  SpO2: 96 % (08/23/23 1149) Vital Signs (24h Range):  Temp:  [98 °F (36.7 °C)-98.2 °F (36.8 °C)] 98 °F (36.7 °C)  Pulse:  [55-91] 59  Resp:  [15-20] 20  SpO2:  [94 %-98 %] 96 %  BP: (138-194)/(50-80) 194/80     Weight: 57 kg (125 lb 10.6 oz) (08/21/23 1228)  Body mass index is 22.98 kg/m².      Intake/Output Summary (Last 24 hours) at 8/23/2023 1622  Last data filed at 8/23/2023 1246  Gross per 24 hour   Intake 1722.52 ml   Output 801 ml   Net 921.52 ml       Lines/Drains/Airways       Drain  Duration                  NG/OG Tube 08/21/23 0530 nasogastric 16 Fr. Right nostril 2 days              Peripheral Intravenous Line  Duration                  Peripheral IV - Single Lumen 08/22/23 2152 20 G Anterior;Left Forearm <1 day                     Physical Exam  Vitals and nursing note reviewed.   Abdominal:      General: Bowel sounds are increased.      Palpations: Abdomen is soft.      Tenderness: There is no abdominal tenderness.   Neurological:      General: No focal deficit present.      Mental Status: She is alert and oriented to person, place, and time. Mental status is at " "baseline.   Psychiatric:         Attention and Perception: Attention normal.         Mood and Affect: Mood and affect normal.         Speech: Speech normal.         Behavior: Behavior normal. Behavior is cooperative.         Thought Content: Thought content normal.         Cognition and Memory: Cognition normal.         Judgment: Judgment normal.          Significant Labs:  CBC:   Recent Labs   Lab 08/22/23  0505 08/23/23  0517   WBC 11.64 9.00   HGB 9.3* 10.4*   HCT 27.9* 32.0*    397     CMP:   Recent Labs   Lab 08/23/23  0517   *   CALCIUM 9.6      K 3.5   CO2 34*      BUN 9   CREATININE 0.7     Coagulation: No results for input(s): "PT", "INR", "APTT" in the last 48 hours.      Significant Imaging:  Imaging results within the past 24 hours have been reviewed.    Assessment/Plan:     Oncology  Anemia  Baseline 12.1/35.6  H&H 9.3/27/9 (dilutional component)  No over GI bleeding  Last colonoscopy 5 years ago      GI  Constipation due to opioid therapy  Imaging consistent with significant stool burden  Golytely via NG tube  Responsive to enema, relistor and golytely bowel prep  Multiple bowel movements      Recommendations:  1. Complete bowel prep  2. Inpatient endoscopic evaluation not indicated  3. Limit opiate use  4. Aggressive bowel regimen at discharge with Miralax TID if opiates are to be continued.      Patient's constipation has resolved with the above regimen. Miralax TID with opiate use. I will sign off. Please contact us if you have any additional questions.      Ivania Mann MD  Gastroenterology  [facility] ([facility])  "

## 2023-08-23 NOTE — PROGRESS NOTES
[facility] ([facility] Medicine  Progress Note    Patient Name: Jessenia Pa  MRN: [id number]  Patient Class: IP- Inpatient   Admission Date: 8/20/2023  Length of Stay: 1 days  Attending Physician: Hay Rojas,*  Primary Care Provider: Ana Brown MD        Subjective:     Principal Problem:SBO (small bowel obstruction)        HPI:  Ms. Wallis is an 82-year-old female with past medical history of hypertension, GERD, Raynauds D, insomnia, osteoporosis, ankle surgery 1977, cholecystectomy 2020, ventral hernia repair 2022,- recent lumbar disc surgery on August 11, 2023, (for chronic intractable low back pain, underwent at [facility]) presented to ED due to complaints of nausea, abdominal pain, discomfort, constipation over past couple of days, which got worsened; At home she was taking Percocet q.4 hours.  She began to have abdominal discomfort and bowel distention and nausea and vomiting.  She is not had any bowel movements for the last 6 days.  Not sure if she was taking stool softners or not; denied fever, chills, chest pain, burning micturition.  Stated that she last passed flatus 3 days ago.  She presented to ED, CT scan was done with oral contrast and there was concern for a bowel obstruction versus a ileus.          Patient appeared alert and oriented, currently with NG tube at the time of examination, with approximately 600 cc output so far; general surgery consulted;     Code status-full code   Ambulates at home without assistance;            Overview/Hospital Course:  Ms. Wallis is an 82-year-old female with past medical history of hypertension, GERD, Raynauds D, insomnia, osteoporosis, ankle surgery 1977, cholecystectomy 2020, ventral hernia repair 2022,- recent lumbar disc surgery on August 11, 2023, (for chronic intractable low back pain, underwent at [facility]) presented to ED due to complaints of nausea, abdominal pain, discomfort, constipation over  past couple of days, which got worsened; At home she was taking Percocet q.4 hours.  She began to have abdominal discomfort and bowel distention and nausea and vomiting.  She is not had any bowel movements for the last 6 days.  Not sure if she was taking stool softners or not; denied fever, chills, chest pain, burning micturition.  Stated that she last passed flatus 3 days ago.  She presented to ED, CT scan was done with oral contrast and there was concern for a bowel obstruction versus a ileus.           Patient appeared alert and oriented, currently with NG tube at the time of examination, with approximately 600 cc output so far; general surgery consulted;     On 08/22, x-ray abdomen showed normal bowel pattern, general surgery recommended colitis NG tube, monitor for return of bowel movement, if patient has bowel movement then recommended to discontinue NG tube and initiate clear liquid diet accordingly, recommended GI on board.- GI recommended Brown bomb enema; Relistor 8mg SC QOD x 3 doses    On 08/23, patient noted to have 2-3 episodes of bowel movements, discussed with General surgery, recommended 500 mL of Colyte through her NG tube.  Followed by removal of NG tube, initiation of diet as per GI;       Interval History:     No acute events overnight, stated feeling slightly better after having bowel movements last night; hemodynamically stable, afebrile.    Leukocytosis resolved.    We will follow up with General surgery, GI    Review of Systems       Constitutional:  Negative for activity change, fatigue and fever.   HENT:  Negative for congestion, ear pain, facial swelling, nosebleeds, sinus pressure and sore throat.    Eyes:  Negative for pain, discharge, redness and visual disturbance.   Respiratory:  Negative for cough, choking, chest tightness, shortness of breath and wheezing.    Cardiovascular:  Negative for chest pain, palpitations and leg swelling.   Gastrointestinal:  Negative for abdominal  distention, abdominal pain and nausea.   Endocrine: Negative for heat intolerance, polydipsia and polyuria.   Genitourinary:  Negative for difficulty urinating, dysuria, flank pain, hematuria and urgency.   Musculoskeletal:  Positive for back pain. Negative for gait problem, joint swelling and myalgias.   Skin:  Negative for color change and rash.   Allergic/Immunologic: Negative for environmental allergies and food allergies.   Neurological:  Negative for dizziness, weakness, numbness and headaches.   Hematological:  Negative for adenopathy. Does not bruise/bleed easily.   Psychiatric/Behavioral:  Negative for agitation and behavioral problems. The patient is not nervous/anxious.      Objective:     Vital Signs (Most Recent):  Temp: 98.2 °F (36.8 °C) (08/23/23 0715)  Pulse: (!) 59 (08/23/23 0715)  Resp: 17 (08/23/23 0905)  BP: (!) 176/72 (08/23/23 0715)  SpO2: 98 % (08/23/23 0715) Vital Signs (24h Range):  Temp:  [98 °F (36.7 °C)-98.2 °F (36.8 °C)] 98.2 °F (36.8 °C)  Pulse:  [55-91] 59  Resp:  [15-17] 17  SpO2:  [94 %-100 %] 98 %  BP: (138-176)/(50-75) 176/72     Weight: 57 kg (125 lb 10.6 oz)  Body mass index is 22.98 kg/m².    Intake/Output Summary (Last 24 hours) at 8/23/2023 1147  Last data filed at 8/23/2023 0603  Gross per 24 hour   Intake 1722.52 ml   Output 800 ml   Net 922.52 ml         Physical Exam         Constitutional: She appears well-developed and well-nourished. She is not diaphoretic. No distress.   HENT:   Head: Normocephalic and atraumatic.   Mouth/Throat: No oropharyngeal exudate.   Eyes: Conjunctivae and EOM are normal. Pupils are equal, round, and reactive to light. Right eye exhibits no discharge. Left eye exhibits no discharge. No scleral icterus.   Neck: Neck supple. No thyromegaly present. No tracheal deviation present. No JVD present.   Normal range of motion.  Cardiovascular:  Normal rate, regular rhythm, normal heart sounds and intact distal pulses.     Exam reveals no gallop and no  friction rub.       No murmur heard.  Pulmonary/Chest: Breath sounds normal. No stridor. No respiratory distress. She has no wheezes. She has no rhonchi. She has no rales. She exhibits no tenderness.   Abdominal: Abdomen is soft. Bowel sounds are normal. She exhibits no distension and no mass. There is no abdominal tenderness. There is no rebound and no guarding.   Musculoskeletal:         General: No tenderness or edema.      Cervical back: Normal range of motion and neck supple.      Comments: Back surgical wound not undressed      Neurological: She is alert and oriented to person, place, and time. She has normal strength.   Skin: Skin is warm and dry. No rash and no abscess noted. No erythema.   Psychiatric: She has a normal mood and affect. Her behavior is normal. Judgment and thought content normal.   Significant Labs: All pertinent labs within the past 24 hours have been reviewed.  CBC:   Recent Labs   Lab 08/22/23  0505 08/23/23  0517   WBC 11.64 9.00   HGB 9.3* 10.4*   HCT 27.9* 32.0*    397     CMP:   Recent Labs   Lab 08/22/23  0505 08/23/23  0517    145   K 3.6 3.5   CL 98 100   CO2 34* 34*    119*   BUN 13 9   CREATININE 0.7 0.7   CALCIUM 8.9 9.6   ANIONGAP 8 11       Significant Imaging:     Imaging Results              CT Abdomen Pelvis  Without Contrast (Final result)  Result time 08/21/23 04:46:16      Final result by Tom Aldana Jr., MD (08/21/23 04:46:16)                   Impression:        1.  Partial small bowel obstruction versus ileus.  Consider in NG tube for decompression.  No specific transition point.  2.  Foci of gas within the bladder.  Correlate with recent Allison catheter placement versus cystitis.      All CT scans at [this location] are performed using dose modulation techniques as appropriate to a performed exam including the following: automated exposure control; adjustment of the mA and/or kV according to patient size (this includes techniques or  standardized protocols for targeted exams where dose is matched to indication / reason for exam; i.e. extremities or head); use of iterative reconstruction technique.    Finalized on: 8/21/2023 4:46 AM By:  Tom Aldana MD  [facility]# [id number]      2023-08-21 04:49:29.619    [facility]               Narrative:    EXAM: CT ABDOMEN PELVIS WITHOUT CONTRAST    CLINICAL HISTORY: Abdominal pain and distention.    COMPARISON: 04/15/2022.    TECHNIQUE: Axial CT imaging through the abdomen and pelvis performed without intravenous contrast are submitted for interpretation. Multiplanar reformats were performed and interpreted.    FINDINGS:    Abdomen:  Lung bases are clear.  The liver is normal in size and contour without focal mass.  No hydronephrosis.  No solid renal mass identified.  The spleen, adrenal glands, and pancreas are within normal limits.  Gallbladder surgically absent.    Trace free fluid in the pelvis. The stomach and small bowel are distended with air-fluid levels.  Maximum diameter of the small bowel is 3.8 cm in the right lower quadrant of the abdomen.  Decompressed ileum in the right lower quadrant of the abdomen.  Specifically abrupt transition point.  The brachial transition to normal caliber small bowel within the midabdomen.No abdominal lymphadenopathy.      Aorta caliber is normal.    Pelvis   Few gas bubbles within otherwise unremarkable urinary bladder.  Appendix not visualized.    The bones are intact.  Prior lumbar fusion posteriorly L3/L4.                                           X-Ray Abdomen Flat And Erect (Final result)  Result time 08/20/23 23:50:23      Final result by Carl Clemens MD (08/20/23 23:50:23)                   Impression:      Findings consistent with small-bowel obstruction.  Moderate amount of stool in the colon      Electronically signed by: Carl Clemens  Date:    08/20/2023  Time:    23:50               Narrative:    EXAMINATION:  XR ABDOMEN FLAT AND ERECT    CLINICAL  HISTORY:  constipation;    TECHNIQUE:  Flat and erect AP views of the abdomen were performed.    COMPARISON:  None    FINDINGS:  Spinal hardware.  Right upper quadrant right lower quadrant surgical clips.  Bowel obstruction is identified.  Mild moderate amount of stool in the colon.                                         Assessment/Plan:      * SBO (small bowel obstruction)    Likely narcotic induced   Currently with NG tube   General surgery on board   Recommended to continue NG tube, follow-up on x-ray imaging in a.m., Gastrografin small-bowel follow-through as needed   Pain medications, antiemetics   NPO         Anemia  Denied any overt signs of bleeding  Follow-up on h and h and consider transfusion if hemoglobin less than 7      Leucocytosis  Likely reactive from underlying obstruction   Afebrile   Urine negative   Will hold antibiotics for now, we follow-up on fever,, WBC/monitor for signs of infection and consider antibiotics accordingly    8/22  Afebrile, wbc trended down       Hypertension  Currently NPO, hold medications as of now   Hydralazine p.r.n.      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    8/23    GI recommended Brown bomb enema; Relistor 8mg SC QOD x 3 doses    On 08/23, patient noted to have 2-3 episodes of bowel movements, discussed with General surgery, recommended 500 mL of Colyte through her NG tube.  Followed by removal of NG tube, initiation of  diet as per GI;           VTE Risk Mitigation (From admission, onward)         Ordered     enoxaparin injection 40 mg  Every 24 hours         08/21/23 1424     IP VTE HIGH RISK PATIENT  Once         08/21/23 1211     Place sequential compression device  Until discontinued         08/21/23 1211                Discharge Planning   JANETH:      Code Status: Full Code   Is the patient medically ready for discharge?:     Reason for patient still in hospital (select all that apply): Patient trending condition and Consult recommendations                     Hay Rojas MD  Department of Hospital Medicine   '[place] - Telemetry ([facility])

## 2023-08-23 NOTE — SUBJECTIVE & OBJECTIVE
"    Subjective:     Interval History: had multiple bowel movements last night. Abdominal discomfort has nearly resolved. TSH normal. NG tube remains in place. Bowel prep being administered. Patient felt "gurgling" in her esophagus. CXR ordered and NG tube tip in stomach.     Review of Systems  Objective:     Vital Signs (Most Recent):  Temp: 98 °F (36.7 °C) (08/23/23 1149)  Pulse: (!) 59 (08/23/23 1149)  Resp: 20 (08/23/23 1149)  BP: (!) 194/80 (08/23/23 1149)  SpO2: 96 % (08/23/23 1149) Vital Signs (24h Range):  Temp:  [98 °F (36.7 °C)-98.2 °F (36.8 °C)] 98 °F (36.7 °C)  Pulse:  [55-91] 59  Resp:  [15-20] 20  SpO2:  [94 %-98 %] 96 %  BP: (138-194)/(50-80) 194/80     Weight: 57 kg (125 lb 10.6 oz) (08/21/23 1228)  Body mass index is 22.98 kg/m².      Intake/Output Summary (Last 24 hours) at 8/23/2023 1622  Last data filed at 8/23/2023 1246  Gross per 24 hour   Intake 1722.52 ml   Output 801 ml   Net 921.52 ml       Lines/Drains/Airways       Drain  Duration                  NG/OG Tube 08/21/23 0530 nasogastric 16 Fr. Right nostril 2 days              Peripheral Intravenous Line  Duration                  Peripheral IV - Single Lumen 08/22/23 2152 20 G Anterior;Left Forearm <1 day                     Physical Exam  Vitals and nursing note reviewed.   Abdominal:      General: Bowel sounds are increased.      Palpations: Abdomen is soft.      Tenderness: There is no abdominal tenderness.   Neurological:      General: No focal deficit present.      Mental Status: She is alert and oriented to person, place, and time. Mental status is at baseline.   Psychiatric:         Attention and Perception: Attention normal.         Mood and Affect: Mood and affect normal.         Speech: Speech normal.         Behavior: Behavior normal. Behavior is cooperative.         Thought Content: Thought content normal.         Cognition and Memory: Cognition normal.         Judgment: Judgment normal.          Significant Labs:  CBC:   Recent " "Labs   Lab 08/22/23  0505 08/23/23  0517   WBC 11.64 9.00   HGB 9.3* 10.4*   HCT 27.9* 32.0*    397     CMP:   Recent Labs   Lab 08/23/23  0517   *   CALCIUM 9.6      K 3.5   CO2 34*      BUN 9   CREATININE 0.7     Coagulation: No results for input(s): "PT", "INR", "APTT" in the last 48 hours.      Significant Imaging:  Imaging results within the past 24 hours have been reviewed.  "

## 2023-08-23 NOTE — CONSULTS
[facility] ([facility])  Gastroenterology  Consult Note    Patient Name: Jessenia Pa  MRN: [id number]  Admission Date: 8/20/2023  Hospital Length of Stay: 0 days  Code Status: Full Code   Attending Provider: Hay Rojas,*   Consulting Provider: Ivania Mann MD  Primary Care Physician: Ana Brown MD  Principal Problem:SBO (small bowel obstruction)    Inpatient consult to Gastroenterology  Consult performed by: Ivania Mann MD  Consult ordered by: Hay Rojas MD  Reason for consult: opioid induced constipation        Subjective:     HPI:  82 y.o female with recent spinal surgery and pain treated with opiates was admitted yesterday with nausea, abdominal pain and constipation. Her partner indicates her last bowel movement was Sunday night and was small. CT was initially concerning for bowel obstruction however PO contrast reached the right colon. Patient seen this afternoon at the request of general surgery. Nasogastric tube initially for concerns for SBO. Approximately 600 cc of dark fluid was aspirated. Presently it is being used to administer golytely bowel prep. She received 500cc and the NG tube was clamped. She tolerated the volume however there was some nausea, no vomiting. No other modalities have been tried to aid her constipation. Partner at bedside. States patient's last colonoscopy was 5 years ago with Dr. [facility] at [facility]. Denies blood in stools.       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 w dr [place]       Past Surgical History:   Procedure Laterality Date    APPENDECTOMY      BLADDER SURGERY      COLONOSCOPY   2008    MULTIPLE BENIGN POLYPS    CYSTOSCOPY  2014    HEMATURIA    EPIDURAL STEROID INJECTION INTO LUMBAR SPINE      KNEE SURGERY      LUMBAR FUSION  08/11/2023    REPAIR OF LIGAMENT OF ANKLE         Review of patient's allergies indicates:  No Known Allergies  Family History       Problem Relation (Age of Onset)    Brain cancer Brother    Heart disease Father          Tobacco Use    Smoking status: Never    Smokeless tobacco: Never   Substance and Sexual Activity    Alcohol use: Never    Drug use: Never    Sexual activity: Never     Review of Systems   Gastrointestinal:  Positive for abdominal distention, abdominal pain, constipation and nausea.   Musculoskeletal:  Positive for back pain.   All other systems reviewed and are negative.    Objective:     Vital Signs (Most Recent):  Temp: 98 °F (36.7 °C) (08/22/23 1919)  Pulse: 91 (08/22/23 1919)  Resp: 15 (08/22/23 2030)  BP: (!) 173/74 (08/22/23 1919)  SpO2: (!) 94 % (08/22/23 1919) Vital Signs (24h Range):  Temp:  [98 °F (36.7 °C)-98.6 °F (37 °C)] 98 °F (36.7 °C)  Pulse:  [53-91] 91  Resp:  [15-20] 15  SpO2:  [94 %-100 %] 94 %  BP: (138-173)/(62-75) 173/74     Weight: 57 kg (125 lb 10.6 oz) (08/21/23 1228)  Body mass index is 22.98 kg/m².      Intake/Output Summary (Last 24 hours) at 8/22/2023 2046  Last data filed at 8/22/2023 0608  Gross per 24 hour   Intake 835.9 ml   Output 400 ml   Net 435.9 ml       Lines/Drains/Airways       Drain  Duration                  NG/OG Tube 08/21/23 0530 nasogastric 16 Fr. Right nostril 1 day              Peripheral Intravenous Line  Duration                  Peripheral IV - Single Lumen 08/20/23 20 G Right Antecubital 2 days                     Physical Exam  Vitals and nursing note reviewed.   Constitutional:       Appearance: She is ill-appearing.   Cardiovascular:      Rate and Rhythm: Normal rate and regular rhythm.   Pulmonary:      Effort: Pulmonary effort is normal.      Breath sounds: Normal breath sounds.  "  Abdominal:      General: Bowel sounds are increased.      Tenderness: There is abdominal tenderness.   Neurological:      General: No focal deficit present.      Mental Status: She is alert and oriented to person, place, and time. Mental status is at baseline.   Psychiatric:         Attention and Perception: Attention normal.         Mood and Affect: Affect is flat.         Speech: Speech normal.         Behavior: Behavior normal. Behavior is cooperative.         Thought Content: Thought content normal.         Cognition and Memory: Cognition normal.         Judgment: Judgment normal.          Significant Labs:  CBC:   Recent Labs   Lab 08/20/23 2254 08/22/23  0505   WBC 13.12* 11.64   HGB 11.8* 9.3*   HCT 34.9* 27.9*    292     CMP:   Recent Labs   Lab 08/20/23 2254 08/22/23  0505   * 105   CALCIUM 10.4 8.9   ALBUMIN 3.3*  --    PROT 6.7  --     140   K 4.0 3.6   CO2 36* 34*   CL 92* 98   BUN 15 13   CREATININE 1.1 0.7   ALKPHOS 89  --    ALT 16  --    AST 24  --    BILITOT 0.4  --      Coagulation: No results for input(s): "PT", "INR", "APTT" in the last 48 hours.    Significant Imaging:  Imaging results within the past 24 hours have been reviewed.    Assessment/Plan:     Oncology  Anemia  Baseline 12.1/35.6  H&H 9.3/27/9 (dilutional component)  No over GI bleeding  Last colonoscopy 5 years ago      GI  Constipation due to opioid therapy  Imaging consistent with significant stool burden  Golytely via NG tube      Recommendations:  1. Brown bomb enema  2. Relistor 8mg SC QOD x 3 doses  3. Check TSH with AM labs      Addendum: enema was administered and patient experienced a large bowel movement according to nursing staff      Thank you for your consult. I will follow-up with patient. Please contact us if you have any additional questions.      Ivania Mann MD  Gastroenterology  [facility] - Tele[facility] ([facility])  "

## 2023-08-23 NOTE — ASSESSMENT & PLAN NOTE
Colyte via NG tube.      Further treatment and recommendations per Gastroenterology.      Recall general surgery as needed

## 2023-08-23 NOTE — PLAN OF CARE
Patient updated on plan of care. Fall  and security precautions in place. Vitals every 4 hours. Bed alarm on. Patient remains free from falls. Education provided; questions encouraged and answered. NG Tube  removed, IV site changed, pain management continued.      Infusion verify- pt received 800 of D5 LR bag during shift. Had to change IV site due to pt complaining of pain in area and IV pump alarming.

## 2023-08-23 NOTE — PT/OT/SLP PROGRESS
Physical Therapy      Patient Name:  Jessenia Pa   MRN:  [id number]    Presented to pt's room at 3926. Patient not seen today secondary to meeting with case management. Will continue efforts.     Renetta Sun, PT, DPT  8/23/2023   1427

## 2023-08-23 NOTE — ASSESSMENT & PLAN NOTE
Baseline 12.1/35.6  H&H 9.3/27/9 (dilutional component)  No over GI bleeding  Last colonoscopy 5 years ago

## 2023-08-23 NOTE — SUBJECTIVE & OBJECTIVE
Interval History:     No acute events overnight, stated feeling slightly better after having bowel movements last night; hemodynamically stable, afebrile.    Leukocytosis resolved.    We will follow up with General surgery, GI    Review of Systems       Constitutional:  Negative for activity change, fatigue and fever.   HENT:  Negative for congestion, ear pain, facial swelling, nosebleeds, sinus pressure and sore throat.    Eyes:  Negative for pain, discharge, redness and visual disturbance.   Respiratory:  Negative for cough, choking, chest tightness, shortness of breath and wheezing.    Cardiovascular:  Negative for chest pain, palpitations and leg swelling.   Gastrointestinal:  Negative for abdominal distention, abdominal pain and nausea.   Endocrine: Negative for heat intolerance, polydipsia and polyuria.   Genitourinary:  Negative for difficulty urinating, dysuria, flank pain, hematuria and urgency.   Musculoskeletal:  Positive for back pain. Negative for gait problem, joint swelling and myalgias.   Skin:  Negative for color change and rash.   Allergic/Immunologic: Negative for environmental allergies and food allergies.   Neurological:  Negative for dizziness, weakness, numbness and headaches.   Hematological:  Negative for adenopathy. Does not bruise/bleed easily.   Psychiatric/Behavioral:  Negative for agitation and behavioral problems. The patient is not nervous/anxious.      Objective:     Vital Signs (Most Recent):  Temp: 98.2 °F (36.8 °C) (08/23/23 0715)  Pulse: (!) 59 (08/23/23 0715)  Resp: 17 (08/23/23 0905)  BP: (!) 176/72 (08/23/23 0715)  SpO2: 98 % (08/23/23 0715) Vital Signs (24h Range):  Temp:  [98 °F (36.7 °C)-98.2 °F (36.8 °C)] 98.2 °F (36.8 °C)  Pulse:  [55-91] 59  Resp:  [15-17] 17  SpO2:  [94 %-100 %] 98 %  BP: (138-176)/(50-75) 176/72     Weight: 57 kg (125 lb 10.6 oz)  Body mass index is 22.98 kg/m².    Intake/Output Summary (Last 24 hours) at 8/23/2023 1147  Last data filed at 8/23/2023  0603  Gross per 24 hour   Intake 1722.52 ml   Output 800 ml   Net 922.52 ml         Physical Exam         Constitutional: She appears well-developed and well-nourished. She is not diaphoretic. No distress.   HENT:   Head: Normocephalic and atraumatic.   Mouth/Throat: No oropharyngeal exudate.   Eyes: Conjunctivae and EOM are normal. Pupils are equal, round, and reactive to light. Right eye exhibits no discharge. Left eye exhibits no discharge. No scleral icterus.   Neck: Neck supple. No thyromegaly present. No tracheal deviation present. No JVD present.   Normal range of motion.  Cardiovascular:  Normal rate, regular rhythm, normal heart sounds and intact distal pulses.     Exam reveals no gallop and no friction rub.       No murmur heard.  Pulmonary/Chest: Breath sounds normal. No stridor. No respiratory distress. She has no wheezes. She has no rhonchi. She has no rales. She exhibits no tenderness.   Abdominal: Abdomen is soft. Bowel sounds are normal. She exhibits no distension and no mass. There is no abdominal tenderness. There is no rebound and no guarding.   Musculoskeletal:         General: No tenderness or edema.      Cervical back: Normal range of motion and neck supple.      Comments: Back surgical wound not undressed      Neurological: She is alert and oriented to person, place, and time. She has normal strength.   Skin: Skin is warm and dry. No rash and no abscess noted. No erythema.   Psychiatric: She has a normal mood and affect. Her behavior is normal. Judgment and thought content normal.   Significant Labs: All pertinent labs within the past 24 hours have been reviewed.  CBC:   Recent Labs   Lab 08/22/23  0505 08/23/23  0517   WBC 11.64 9.00   HGB 9.3* 10.4*   HCT 27.9* 32.0*    397     CMP:   Recent Labs   Lab 08/22/23  0505 08/23/23  0517    145   K 3.6 3.5   CL 98 100   CO2 34* 34*    119*   BUN 13 9   CREATININE 0.7 0.7   CALCIUM 8.9 9.6   ANIONGAP 8 11       Significant  Imaging:     Imaging Results              CT Abdomen Pelvis  Without Contrast (Final result)  Result time 08/21/23 04:46:16      Final result by Tom Aldana Jr., MD (08/21/23 04:46:16)                   Impression:        1.  Partial small bowel obstruction versus ileus.  Consider in NG tube for decompression.  No specific transition point.  2.  Foci of gas within the bladder.  Correlate with recent Allison catheter placement versus cystitis.      All CT scans at [this location] are performed using dose modulation techniques as appropriate to a performed exam including the following: automated exposure control; adjustment of the mA and/or kV according to patient size (this includes techniques or standardized protocols for targeted exams where dose is matched to indication / reason for exam; i.e. extremities or head); use of iterative reconstruction technique.    Finalized on: 8/21/2023 4:46 AM By:  Tom Aldana MD  [facility]# [id number]      2023-08-21 04:49:29.619    [facility]               Narrative:    EXAM: CT ABDOMEN PELVIS WITHOUT CONTRAST    CLINICAL HISTORY: Abdominal pain and distention.    COMPARISON: 04/15/2022.    TECHNIQUE: Axial CT imaging through the abdomen and pelvis performed without intravenous contrast are submitted for interpretation. Multiplanar reformats were performed and interpreted.    FINDINGS:    Abdomen:  Lung bases are clear.  The liver is normal in size and contour without focal mass.  No hydronephrosis.  No solid renal mass identified.  The spleen, adrenal glands, and pancreas are within normal limits.  Gallbladder surgically absent.    Trace free fluid in the pelvis. The stomach and small bowel are distended with air-fluid levels.  Maximum diameter of the small bowel is 3.8 cm in the right lower quadrant of the abdomen.  Decompressed ileum in the right lower quadrant of the abdomen.  Specifically abrupt transition point.  The brachial transition to normal caliber small bowel within the  midabdomen.No abdominal lymphadenopathy.      Aorta caliber is normal.    Pelvis   Few gas bubbles within otherwise unremarkable urinary bladder.  Appendix not visualized.    The bones are intact.  Prior lumbar fusion posteriorly L3/L4.                                           X-Ray Abdomen Flat And Erect (Final result)  Result time 08/20/23 23:50:23      Final result by Carl Clemens MD (08/20/23 23:50:23)                   Impression:      Findings consistent with small-bowel obstruction.  Moderate amount of stool in the colon      Electronically signed by: Carl Clemens  Date:    08/20/2023  Time:    23:50               Narrative:    EXAMINATION:  XR ABDOMEN FLAT AND ERECT    CLINICAL HISTORY:  constipation;    TECHNIQUE:  Flat and erect AP views of the abdomen were performed.    COMPARISON:  None    FINDINGS:  Spinal hardware.  Right upper quadrant right lower quadrant surgical clips.  Bowel obstruction is identified.  Mild moderate amount of stool in the colon.

## 2023-08-23 NOTE — PROGRESS NOTES
[facility]  General Surgery  Progress Note    Subjective:     History of Present Illness:  [age] Year old female who underwent a lower back surgery on August 11th 2023.  For intractable lower back pain.  Done at the [facility]. .  She was hospitalized for several is postoperatively and discharged home.  At home she was taking Percocet q.4 hours.  She began to have abdominal discomfort and bowel distention and nausea and vomiting.  She is not had any bowel movements for the last 6 days.  She presented to CT scan was done with oral contrast and there was concern for a bowel obstruction versus a ileus.          Post-Op Info:  * No surgery found *         Interval History:  Enema and multiple bowel movements.  Would recommend another 500 mL of Colyte through her NG tube.  Remove NG tube and diet per Gastroenterology      Medications:  Continuous Infusions:   dextrose 5% lactated ringers 100 mL/hr at 08/23/23 0601     Scheduled Meds:   enoxparin  40 mg Subcutaneous Q24H (prophylaxis, 1700)    methylnaltrexone  8 mg Subcutaneous Every other day     PRN Meds:hydrALAZINE, morphine, morphine, ondansetron, sodium chloride 0.9%     Review of patient's allergies indicates:  No Known Allergies  Objective:     Vital Signs (Most Recent):  Temp: 98.2 °F (36.8 °C) (08/23/23 0715)  Pulse: (!) 59 (08/23/23 0715)  Resp: 17 (08/23/23 0905)  BP: (!) 176/72 (08/23/23 0715)  SpO2: 98 % (08/23/23 0715) Vital Signs (24h Range):  Temp:  [98 °F (36.7 °C)-98.6 °F (37 °C)] 98.2 °F (36.8 °C)  Pulse:  [53-91] 59  Resp:  [15-17] 17  SpO2:  [94 %-100 %] 98 %  BP: (138-176)/(50-75) 176/72     Weight: 57 kg (125 lb 10.6 oz)  Body mass index is 22.98 kg/m².    Intake/Output - Last 3 Shifts         08/21 0700 08/22 0659 08/22 0700 08/23 0659 08/23 0700 08/24 0659    I.V. (mL/kg) 835.9 (14.7) 1722.5 (30.2)     Total Intake(mL/kg) 835.9 (14.7) 1722.5 (30.2)     Drains 950 800     Stool  0     Total Output 950 800      Net -114.1 +922.5            Stool Occurrence  1 x              Physical Exam  Vitals reviewed.   Constitutional:       Appearance: She is well-developed.   HENT:      Head: Normocephalic.   Eyes:      Pupils: Pupils are equal, round, and reactive to light.   Neck:      Thyroid: No thyromegaly.      Vascular: No JVD.      Trachea: No tracheal deviation.   Cardiovascular:      Rate and Rhythm: Normal rate and regular rhythm.      Heart sounds: Normal heart sounds.   Pulmonary:      Breath sounds: Normal breath sounds. No wheezing.   Abdominal:      General: Abdomen is flat. Bowel sounds are normal. There is no distension.      Palpations: Abdomen is soft. Abdomen is not rigid. There is no mass.      Tenderness: There is no abdominal tenderness (Minimal). There is no guarding or rebound.   Musculoskeletal:         General: Normal range of motion.   Lymphadenopathy:      Cervical: No cervical adenopathy.   Skin:     General: Skin is warm and dry.      Findings: No erythema or rash.   Neurological:      Mental Status: She is alert and oriented to person, place, and time.   Psychiatric:         Mood and Affect: Mood normal.         Behavior: Behavior normal.         Thought Content: Thought content normal.         Judgment: Judgment normal.          Significant Labs:  I have reviewed all pertinent lab results within the past 24 hours.  CBC:   Recent Labs   Lab 08/23/23  0517   WBC 9.00   RBC 3.50*   HGB 10.4*   HCT 32.0*      MCV 91   MCH 29.7   MCHC 32.5     BMP:   Recent Labs   Lab 08/23/23  0517   *      K 3.5      CO2 34*   BUN 9   CREATININE 0.7   CALCIUM 9.6   MG 1.7       Significant Diagnostics:  I have reviewed all pertinent imaging results/findings within the past 24 hours.  No new    Assessment/Plan:     * SBO (small bowel obstruction)  Abdominal film showed no dilated loops of small bowel.  This likely represents narcotic induced constipation.    GI consult reviewed  Colyte via NG  tube again today  NG tube could then be removed per Gastroenterology  Initiated a diet per Gastroenterology     Constipation due to opioid therapy  Colyte via NG tube.      Further treatment and recommendations per Gastroenterology.      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        Ludwig Aguila MD  General Surgery  [facility] ([facility])

## 2023-08-23 NOTE — PLAN OF CARE
[facility] - Telemetry (Hospital)  Initial Discharge Assessment       Primary Care Provider: Ana Brown MD    Admission Diagnosis: Ileus [K56.7]  Partial small bowel obstruction [K56.600]  Constipation due to opioid therapy [K59.03, T40.2X5A]    Admission Date: 8/20/2023  Expected Discharge Date:     Transition of Care Barriers: None    Payor: HU[employer] MANAGED MEDICARE / Plan: HU[employer] MEDICARE HMO / Product Type: Capitation /     Extended Emergency Contact Information  Primary Emergency Contact: Rodney Landaverde  Address: [address] [place] [address] [place] [address] [facility]  Home Phone: [phone]  Mobile Phone: [phone]  Relation: Significant other    Discharge Plan A: Home, Home with family, Home Health         [id number] - [place] - [address]   [address] [place] [address]  Phone: [phone] Fax: [phone]    [facility] [place] - [address]  [address]  [address]  [place] [address]  Phone: [phone] Fax: [phone]      Initial Assessment (most recent)       Adult Discharge Assessment - 08/23/23 1444          Discharge Assessment    Assessment Type Discharge Planning Assessment     Confirmed/corrected address, phone number and insurance Yes     Confirmed Demographics Correct on Facesheet     Source of Information patient     Communicated JANETH with patient/caregiver Date not available/Unable to determine     Reason For Admission SBO     People in Home significant other     Facility Arrived From: Home     Do you expect to return to your current living situation? Yes     Do you have help at home or someone to help you manage your care at home? Yes     Who are your caregiver(s) and their phone number(s)? Pt's s/o, [facility]     Prior to hospitilization cognitive status: Alert/Oriented     Current cognitive status: Alert/Oriented     Equipment Currently Used at Home walker, rolling;shower chair     Readmission within 30 days?  No     Patient currently being followed by outpatient case management? No     Do you currently have service(s) that help you manage your care at home? No     Do you take prescription medications? Yes     Do you have prescription coverage? Yes     Do you have any problems affording any of your prescribed medications? No     Is the patient taking medications as prescribed? yes     Who is going to help you get home at discharge? Pt's s/o     How do you get to doctors appointments? family or friend will provide;car, drives self     Are you on dialysis? No     Do you take coumadin? No     DME Needed Upon Discharge  none     Discharge Plan discussed with: Patient     Transition of Care Barriers None     Discharge Plan A Home;Home with family;Home Health                   SW met with patient at bedside to complete discharge assessment. Pt reports living at home alone but will be discharging to her s/o's house, [place]. [facility] will be help at home and discharge transportation. DC address: [facility] [place] [address].     SW discussed PT/OT recs for home health. Pt agreeable. SW advise CM will need to secure care in [place] where patient will discharge to.     Pt's whiteboard updated to reflect CM contact information. SW to remain available to assist with DC needs.

## 2023-08-23 NOTE — SUBJECTIVE & OBJECTIVE
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 w dr [place]       Past Surgical History:   Procedure Laterality Date    APPENDECTOMY      BLADDER SURGERY      COLONOSCOPY  2008    MULTIPLE BENIGN POLYPS    CYSTOSCOPY  2014    HEMATURIA    EPIDURAL STEROID INJECTION INTO LUMBAR SPINE      KNEE SURGERY      LUMBAR FUSION  08/11/2023    REPAIR OF LIGAMENT OF ANKLE         Review of patient's allergies indicates:  No Known Allergies  Family History       Problem Relation (Age of Onset)    Brain cancer Brother    Heart disease Father          Tobacco Use    Smoking status: Never    Smokeless tobacco: Never   Substance and Sexual Activity    Alcohol use: Never    Drug use: Never    Sexual activity: Never     Review of Systems   Gastrointestinal:  Positive for abdominal distention, abdominal pain, constipation and nausea.   Musculoskeletal:  Positive for back pain.   All other systems reviewed and are negative.    Objective:     Vital Signs (Most Recent):  Temp: 98 °F (36.7 °C) (08/22/23 1919)  Pulse: 91 (08/22/23 1919)  Resp: 15 (08/22/23 2030)  BP: (!) 173/74 (08/22/23 1919)  SpO2: (!) 94 % (08/22/23 1919) Vital Signs (24h Range):  Temp:  [98 °F (36.7 °C)-98.6 °F (37 °C)] 98 °F (36.7 °C)  Pulse:  [53-91] 91  Resp:  [15-20] 15  SpO2:  [94 %-100 %] 94 %  BP: (138-173)/(62-75) 173/74     Weight: 57 kg (125 lb 10.6 oz) (08/21/23 1228)  Body mass index is 22.98 kg/m².      Intake/Output Summary (Last 24 hours) at 8/22/2023 2046  Last data filed at 8/22/2023 0608  Gross per 24 hour   Intake 835.9 ml   Output 400 ml   Net 435.9 ml       Lines/Drains/Airways       Drain  Duration                  NG/OG Tube 08/21/23  "0530 nasogastric 16 Fr. Right nostril 1 day              Peripheral Intravenous Line  Duration                  Peripheral IV - Single Lumen 08/20/23 20 G Right Antecubital 2 days                     Physical Exam  Vitals and nursing note reviewed.   Constitutional:       Appearance: She is ill-appearing.   Cardiovascular:      Rate and Rhythm: Normal rate and regular rhythm.   Pulmonary:      Effort: Pulmonary effort is normal.      Breath sounds: Normal breath sounds.   Abdominal:      General: Bowel sounds are increased.      Tenderness: There is abdominal tenderness.   Neurological:      General: No focal deficit present.      Mental Status: She is alert and oriented to person, place, and time. Mental status is at baseline.   Psychiatric:         Attention and Perception: Attention normal.         Mood and Affect: Affect is flat.         Speech: Speech normal.         Behavior: Behavior normal. Behavior is cooperative.         Thought Content: Thought content normal.         Cognition and Memory: Cognition normal.         Judgment: Judgment normal.          Significant Labs:  CBC:   Recent Labs   Lab 08/20/23 2254 08/22/23  0505   WBC 13.12* 11.64   HGB 11.8* 9.3*   HCT 34.9* 27.9*    292     CMP:   Recent Labs   Lab 08/20/23 2254 08/22/23  0505   * 105   CALCIUM 10.4 8.9   ALBUMIN 3.3*  --    PROT 6.7  --     140   K 4.0 3.6   CO2 36* 34*   CL 92* 98   BUN 15 13   CREATININE 1.1 0.7   ALKPHOS 89  --    ALT 16  --    AST 24  --    BILITOT 0.4  --      Coagulation: No results for input(s): "PT", "INR", "APTT" in the last 48 hours.    Significant Imaging:  Imaging results within the past 24 hours have been reviewed.  "

## 2023-08-23 NOTE — ASSESSMENT & PLAN NOTE
Imaging consistent with significant stool burden  Golytely via NG tube  Responsive to enema, relistor and golytely bowel prep  Multiple bowel movements

## 2023-08-23 NOTE — SUBJECTIVE & OBJECTIVE
Interval History:  Enema and multiple bowel movements.  Would recommend another 500 mL of Colyte through her NG tube.  Remove NG tube and diet per Gastroenterology      Medications:  Continuous Infusions:   dextrose 5% lactated ringers 100 mL/hr at 08/23/23 0601     Scheduled Meds:   enoxparin  40 mg Subcutaneous Q24H (prophylaxis, 1700)    methylnaltrexone  8 mg Subcutaneous Every other day     PRN Meds:hydrALAZINE, morphine, morphine, ondansetron, sodium chloride 0.9%     Review of patient's allergies indicates:  No Known Allergies  Objective:     Vital Signs (Most Recent):  Temp: 98.2 °F (36.8 °C) (08/23/23 0715)  Pulse: (!) 59 (08/23/23 0715)  Resp: 17 (08/23/23 0905)  BP: (!) 176/72 (08/23/23 0715)  SpO2: 98 % (08/23/23 0715) Vital Signs (24h Range):  Temp:  [98 °F (36.7 °C)-98.6 °F (37 °C)] 98.2 °F (36.8 °C)  Pulse:  [53-91] 59  Resp:  [15-17] 17  SpO2:  [94 %-100 %] 98 %  BP: (138-176)/(50-75) 176/72     Weight: 57 kg (125 lb 10.6 oz)  Body mass index is 22.98 kg/m².    Intake/Output - Last 3 Shifts         08/21 0700 08/22 0659 08/22 0700 08/23 0659 08/23 0700 08/24 0659    I.V. (mL/kg) 835.9 (14.7) 1722.5 (30.2)     Total Intake(mL/kg) 835.9 (14.7) 1722.5 (30.2)     Drains 950 800     Stool  0     Total Output 950 800     Net -114.1 +922.5            Stool Occurrence  1 x              Physical Exam  Vitals reviewed.   Constitutional:       Appearance: She is well-developed.   HENT:      Head: Normocephalic.   Eyes:      Pupils: Pupils are equal, round, and reactive to light.   Neck:      Thyroid: No thyromegaly.      Vascular: No JVD.      Trachea: No tracheal deviation.   Cardiovascular:      Rate and Rhythm: Normal rate and regular rhythm.      Heart sounds: Normal heart sounds.   Pulmonary:      Breath sounds: Normal breath sounds. No wheezing.   Abdominal:      General: Abdomen is flat. Bowel sounds are normal. There is no distension.      Palpations: Abdomen is soft. Abdomen is not rigid. There is  no mass.      Tenderness: There is no abdominal tenderness (Minimal). There is no guarding or rebound.   Musculoskeletal:         General: Normal range of motion.   Lymphadenopathy:      Cervical: No cervical adenopathy.   Skin:     General: Skin is warm and dry.      Findings: No erythema or rash.   Neurological:      Mental Status: She is alert and oriented to person, place, and time.   Psychiatric:         Mood and Affect: Mood normal.         Behavior: Behavior normal.         Thought Content: Thought content normal.         Judgment: Judgment normal.          Significant Labs:  I have reviewed all pertinent lab results within the past 24 hours.  CBC:   Recent Labs   Lab 08/23/23  0517   WBC 9.00   RBC 3.50*   HGB 10.4*   HCT 32.0*      MCV 91   MCH 29.7   MCHC 32.5     BMP:   Recent Labs   Lab 08/23/23 0517   *      K 3.5      CO2 34*   BUN 9   CREATININE 0.7   CALCIUM 9.6   MG 1.7       Significant Diagnostics:  I have reviewed all pertinent imaging results/findings within the past 24 hours.  No new

## 2023-08-23 NOTE — PLAN OF CARE
Problem: Adult Inpatient Plan of Care  Goal: Plan of Care Review  Outcome: Ongoing, Progressing  Goal: Absence of Hospital-Acquired Illness or Injury  Outcome: Ongoing, Progressing     Problem: Nausea and Vomiting  Goal: Fluid and Electrolyte Balance  Outcome: Ongoing, Progressing     Problem: Pain Acute  Goal: Acceptable Pain Control and Functional Ability  Outcome: Ongoing, Progressing     Problem: Fall Injury Risk  Goal: Absence of Fall and Fall-Related Injury  Outcome: Ongoing, Progressing     Problem: Impaired Wound Healing  Goal: Optimal Wound Healing  Outcome: Ongoing, Progressing

## 2023-08-24 LAB
ANION GAP SERPL CALC-SCNC: 11 MMOL/L (ref 8–16)
BASOPHILS # BLD AUTO: 0.04 K/UL (ref 0–0.2)
BASOPHILS NFR BLD: 0.4 % (ref 0–1.9)
BUN SERPL-MCNC: 5 MG/DL (ref 8–23)
CALCIUM SERPL-MCNC: 8.5 MG/DL (ref 8.7–10.5)
CHLORIDE SERPL-SCNC: 104 MMOL/L (ref 95–110)
CO2 SERPL-SCNC: 29 MMOL/L (ref 23–29)
CREAT SERPL-MCNC: 0.6 MG/DL (ref 0.5–1.4)
DIFFERENTIAL METHOD: ABNORMAL
EOSINOPHIL # BLD AUTO: 0.3 K/UL (ref 0–0.5)
EOSINOPHIL NFR BLD: 3.2 % (ref 0–8)
ERYTHROCYTE [DISTWIDTH] IN BLOOD BY AUTOMATED COUNT: 12.9 % (ref 11.5–14.5)
EST. GFR  (NO RACE VARIABLE): >60 ML/MIN/1.73 M^2
GLUCOSE SERPL-MCNC: 114 MG/DL (ref 70–110)
HCT VFR BLD AUTO: 27.9 % (ref 37–48.5)
HGB BLD-MCNC: 9.3 G/DL (ref 12–16)
IMM GRANULOCYTES # BLD AUTO: 0.09 K/UL (ref 0–0.04)
IMM GRANULOCYTES NFR BLD AUTO: 0.9 % (ref 0–0.5)
LYMPHOCYTES # BLD AUTO: 1.2 K/UL (ref 1–4.8)
LYMPHOCYTES NFR BLD: 12.7 % (ref 18–48)
MAGNESIUM SERPL-MCNC: 1.5 MG/DL (ref 1.6–2.6)
MCH RBC QN AUTO: 30.4 PG (ref 27–31)
MCHC RBC AUTO-ENTMCNC: 33.3 G/DL (ref 32–36)
MCV RBC AUTO: 91 FL (ref 82–98)
MONOCYTES # BLD AUTO: 0.9 K/UL (ref 0.3–1)
MONOCYTES NFR BLD: 9.8 % (ref 4–15)
NEUTROPHILS # BLD AUTO: 7 K/UL (ref 1.8–7.7)
NEUTROPHILS NFR BLD: 73 % (ref 38–73)
NRBC BLD-RTO: 0 /100 WBC
PLATELET # BLD AUTO: 297 K/UL (ref 150–450)
PMV BLD AUTO: 9.5 FL (ref 9.2–12.9)
POTASSIUM SERPL-SCNC: 2.9 MMOL/L (ref 3.5–5.1)
RBC # BLD AUTO: 3.06 M/UL (ref 4–5.4)
SODIUM SERPL-SCNC: 144 MMOL/L (ref 136–145)
WBC # BLD AUTO: 9.52 K/UL (ref 3.9–12.7)

## 2023-08-24 PROCEDURE — 85025 COMPLETE CBC W/AUTO DIFF WBC: CPT | Performed by: STUDENT IN AN ORGANIZED HEALTH CARE EDUCATION/TRAINING PROGRAM

## 2023-08-24 PROCEDURE — 25000003 PHARM REV CODE 250: Performed by: NURSE PRACTITIONER

## 2023-08-24 PROCEDURE — 63600175 PHARM REV CODE 636 W HCPCS: Performed by: INTERNAL MEDICINE

## 2023-08-24 PROCEDURE — 25000003 PHARM REV CODE 250: Performed by: STUDENT IN AN ORGANIZED HEALTH CARE EDUCATION/TRAINING PROGRAM

## 2023-08-24 PROCEDURE — 94761 N-INVAS EAR/PLS OXIMETRY MLT: CPT

## 2023-08-24 PROCEDURE — 21400001 HC TELEMETRY ROOM

## 2023-08-24 PROCEDURE — 36415 COLL VENOUS BLD VENIPUNCTURE: CPT | Performed by: STUDENT IN AN ORGANIZED HEALTH CARE EDUCATION/TRAINING PROGRAM

## 2023-08-24 PROCEDURE — 83735 ASSAY OF MAGNESIUM: CPT | Performed by: STUDENT IN AN ORGANIZED HEALTH CARE EDUCATION/TRAINING PROGRAM

## 2023-08-24 PROCEDURE — 97530 THERAPEUTIC ACTIVITIES: CPT

## 2023-08-24 PROCEDURE — 27000221 HC OXYGEN, UP TO 24 HOURS

## 2023-08-24 PROCEDURE — 80048 BASIC METABOLIC PNL TOTAL CA: CPT | Performed by: STUDENT IN AN ORGANIZED HEALTH CARE EDUCATION/TRAINING PROGRAM

## 2023-08-24 PROCEDURE — 63600175 PHARM REV CODE 636 W HCPCS: Performed by: STUDENT IN AN ORGANIZED HEALTH CARE EDUCATION/TRAINING PROGRAM

## 2023-08-24 RX ORDER — TRAZODONE HYDROCHLORIDE 100 MG/1
100 TABLET ORAL NIGHTLY
Status: DISCONTINUED | OUTPATIENT
Start: 2023-08-24 | End: 2023-08-25 | Stop reason: HOSPADM

## 2023-08-24 RX ORDER — OXYCODONE AND ACETAMINOPHEN 10; 325 MG/1; MG/1
1 TABLET ORAL EVERY 6 HOURS PRN
Status: DISCONTINUED | OUTPATIENT
Start: 2023-08-24 | End: 2023-08-25 | Stop reason: HOSPADM

## 2023-08-24 RX ORDER — METHOCARBAMOL 500 MG/1
500 TABLET, FILM COATED ORAL EVERY 8 HOURS PRN
Status: DISCONTINUED | OUTPATIENT
Start: 2023-08-24 | End: 2023-08-25 | Stop reason: HOSPADM

## 2023-08-24 RX ORDER — AMLODIPINE BESYLATE 5 MG/1
5 TABLET ORAL DAILY
Status: DISCONTINUED | OUTPATIENT
Start: 2023-08-24 | End: 2023-08-25 | Stop reason: HOSPADM

## 2023-08-24 RX ORDER — POTASSIUM CHLORIDE 20 MEQ/1
40 TABLET, EXTENDED RELEASE ORAL
Status: COMPLETED | OUTPATIENT
Start: 2023-08-24 | End: 2023-08-24

## 2023-08-24 RX ORDER — MAGNESIUM SULFATE 1 G/100ML
1 INJECTION INTRAVENOUS ONCE
Status: COMPLETED | OUTPATIENT
Start: 2023-08-24 | End: 2023-08-24

## 2023-08-24 RX ADMIN — POTASSIUM CHLORIDE 40 MEQ: 1500 TABLET, EXTENDED RELEASE ORAL at 11:08

## 2023-08-24 RX ADMIN — ENOXAPARIN SODIUM 40 MG: 40 INJECTION SUBCUTANEOUS at 05:08

## 2023-08-24 RX ADMIN — AMLODIPINE BESYLATE 5 MG: 5 TABLET ORAL at 09:08

## 2023-08-24 RX ADMIN — POTASSIUM CHLORIDE 40 MEQ: 1500 TABLET, EXTENDED RELEASE ORAL at 09:08

## 2023-08-24 RX ADMIN — MORPHINE SULFATE 2 MG: 2 INJECTION, SOLUTION INTRAMUSCULAR; INTRAVENOUS at 05:08

## 2023-08-24 RX ADMIN — METHOCARBAMOL 500 MG: 500 TABLET ORAL at 10:08

## 2023-08-24 RX ADMIN — MAGNESIUM SULFATE IN DEXTROSE 1 G: 10 INJECTION, SOLUTION INTRAVENOUS at 09:08

## 2023-08-24 RX ADMIN — OXYCODONE AND ACETAMINOPHEN 1 TABLET: 10; 325 TABLET ORAL at 10:08

## 2023-08-24 RX ADMIN — MORPHINE SULFATE 2 MG: 2 INJECTION, SOLUTION INTRAMUSCULAR; INTRAVENOUS at 11:08

## 2023-08-24 RX ADMIN — SODIUM CHLORIDE, SODIUM LACTATE, POTASSIUM CHLORIDE, CALCIUM CHLORIDE AND DEXTROSE MONOHYDRATE: 5; 600; 310; 30; 20 INJECTION, SOLUTION INTRAVENOUS at 09:08

## 2023-08-24 RX ADMIN — TRAZODONE HYDROCHLORIDE 100 MG: 100 TABLET ORAL at 10:08

## 2023-08-24 RX ADMIN — SODIUM CHLORIDE, SODIUM LACTATE, POTASSIUM CHLORIDE, CALCIUM CHLORIDE AND DEXTROSE MONOHYDRATE: 5; 600; 310; 30; 20 INJECTION, SOLUTION INTRAVENOUS at 05:08

## 2023-08-24 RX ADMIN — METHYLNALTREXONE BROMIDE 4 MG: 8 INJECTION, SOLUTION SUBCUTANEOUS at 05:08

## 2023-08-24 NOTE — PHYSICIAN QUERY
PT Name: Jessenia Pa  MR #: [id number]     DOCUMENTATION CLARIFICATION     CDS/: Nallely William RN, CDS               Contact information: [email or web address]    This form is a permanent document in the medical record.     Query Date: August 24, 2023    By submitting this query, we are merely seeking further clarification of documentation to reflect the severity of illness of your patient. Please utilize your independent clinical judgment when addressing the question(s) below.    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  This likely represents narcotic induced constipation   CT Abdomen 8/21      Surgery PN 8/22   x Treatment/Medication Currently with NG tube   Recommended to continue NG tube, follow-up on x-ray imaging in a.m., Gastrografin small-bowel follow-through as needed   Pain medications, antiemetics   NPO     Colyte via NG tube.       enema was administered and patient experienced a large bowel movement     Responsive to enema, relistor and golytely bowel prep  Patient's constipation has resolved with the above regimen. Miralax TID with opiate use H&P 8/21             Surgery PN 8/22    GI Consult  8/22      GI PN 8/23     Procedure/Surgery     x Other recent lumbar disc surgery on August 11, 2023, (for chronic intractable low back pain, underwent at [facility]) presented to ED due to complaints of nausea, abdominal pain, discomfort, constipation over past couple of days, which got worsened; At home she was taking Percocet q.4 hours.  She began to have abdominal discomfort and bowel distention and nausea and vomiting.  She is not had any bowel movements for the last 6 days    Abdomen is soft. Bowel sounds are normal. She exhibits no distension and no mass H&P 8/21                   H&P 8/21      Provider, please clarify a diagnosis or diagnoses associated with the above findings:  [   ] Partial or incomplete intestinal obstruction, due to known or suspected etiology (please specify): ____________   [ x  ] Partial or incomplete intestinal obstruction, unknown or unspecified etiology   [   ] Adynamic or paralytic ileus    [   ] Unspecified ileus    [   ] Opoid induced constipation   [   ] Other intestinal condition or clarification (please specify): _____________________           Please document in your progress notes daily for the duration of treatment until resolved, and include in your discharge summary.

## 2023-08-24 NOTE — PT/OT/SLP PROGRESS
Physical Therapy  Treatment    Jessenia Pa   MRN: [id number]   Admitting Diagnosis: SBO (small bowel obstruction)    PT Received On: 08/24/23  PT Start Time: 1045     PT Stop Time: 1055    PT Total Time (min): 10 min       Billable Minutes:  Therapeutic Activity 10    Treatment Type: Treatment  PT/PTA: PTA     Number of PTA visits since last PT visit: 1       General Precautions: Standard, fall, respiratory  Orthopedic Precautions: spinal precautions  Braces: LSO           Subjective:  Communicated with patient's nurse, Chinyere, and completed Epic chart review  prior to session.  Patient's family member reported having walked with patient around the nurses station and that patient had sat up earlier this morning for several hours.   Requesting patient be allowed to rest now. She states she will walk with the patient again around lunch time.    Pain/Comfort  Pain Rating 1: 0/10  Pain Rating Post-Intervention 1: 0/10    Objective:   Patient found with: peripheral IV, telemetry, oxygen    Educated patient on importance of full and active participation in functional mobility training to prevent further loss of ROM and strength.   Encouraged patient to request assistance from nursing staff to get OOB at least 3x/day with all meals in addition to ambulating to/from the bathroom to promote recovery of CV endurance. Also encouraged patient to perform AROM TE to BLE throughout the day within all available planes of motion. Re enforced importance of utilizing call light to meet needs in room and not attempt to get up without staff assistance. Patient verbalized understanding of all education given and agreed to comply.    AM-PAC 6 CLICK MOBILITY  How much help from another person does this patient currently need?   1 = Unable, Total/Dependent Assistance  2 = A lot, Maximum/Moderate Assistance  3 = A little, Minimum/Contact Guard/Supervision  4 = None, Modified [place]/Independent    Turning over in bed (including  adjusting bedclothes, sheets and blankets)?: 1 (REFUSED)  Sitting down on and standing up from a chair with arms (e.g., wheelchair, bedside commode, etc.): 1 (REFUSED)  Moving from lying on back to sitting on the side of the bed?: 1 (REFUSED)  Moving to and from a bed to a chair (including a wheelchair)?: 1 (REFUSED)  Need to walk in hospital room?: 1 (REFUSED)  Climbing 3-5 steps with a railing?: 1 (NT)  Basic Mobility Total Score: 6    AM-PAC Raw Score CMS G-Code Modifier Level of Impairment Assistance   6 % Total / Unable   7 - 9 CM 80 - 100% Maximal Assist   10 - 14 CL 60 - 80% Moderate Assist   15 - 19 CK 40 - 60% Moderate Assist   20 - 22 CJ 20 - 40% Minimal Assist   23 CI 1-20% SBA / CGA   24 CH 0% Independent/ Mod I     Patient left supine with call button in reach and family member present.    Assessment:  Jessenia Pa is a 82 y.o. female with a medical diagnosis of SBO (small bowel obstruction) and presents with overall decline in functional mobility. Patient would continue to benefit from skilled PT to address functional limitations listed below in order to return to PLOF/decrease caregiver burden.     Rehab identified problem list/impairments: weakness, impaired endurance, impaired self care skills, impaired functional mobility, impaired balance, impaired cardiopulmonary response to activity, orthopedic precautions, impaired fine motor    Rehab potential is good.    Activity tolerance: unable to determine    Discharge recommendations: home health PT (24/7 SPV & A)      Barriers to discharge:      Equipment recommendations: none     GOALS:   Multidisciplinary Problems       Physical Therapy Goals          Problem: Physical Therapy    Goal Priority Disciplines Outcome Goal Variances Interventions   Physical Therapy Goal     PT, PT/OT      Description: LTG'S TO BE MET IN 14 DAYS (9-5-23)  PT WILL REQUIRE SBA FOR BED MOBILITY  PT WILL REQUIRE SBA FOR BED<>CHAIR TF'S  PT WILL  FEET WITH RW  AND SBA  PT WILL INC AMPAC SCORE BY 2 POINTS TO PROGRESS GROSS FUNC MOBILITY                         PLAN:    Patient to be seen 3 x/week to address the above listed problems via gait training, therapeutic activities, therapeutic exercises  Plan of Care expires: 09/05/23  Plan of Care reviewed with: patient, family         08/24/2023

## 2023-08-24 NOTE — PROGRESS NOTES
[facility]  Progress Note    Patient Name: Jessenia Pa  MRN: [id number]  Patient Class: IP- Inpatient   Admission Date: 8/20/2023  Length of Stay: 2 days  Attending Physician: Hay Rojas,*  Primary Care Provider: Ana Brown MD        Subjective:     Principal Problem:SBO (small bowel obstruction)        HPI:  Ms. Wallis is an 82-year-old female with past medical history of hypertension, GERD, Raynauds D, insomnia, osteoporosis, ankle surgery 1977, cholecystectomy 2020, ventral hernia repair 2022,- recent lumbar disc surgery on August 11, 2023, (for chronic intractable low back pain, underwent at [facility]) presented to ED due to complaints of nausea, abdominal pain, discomfort, constipation over past couple of days, which got worsened; At home she was taking Percocet q.4 hours.  She began to have abdominal discomfort and bowel distention and nausea and vomiting.  She is not had any bowel movements for the last 6 days.  Not sure if she was taking stool softners or not; denied fever, chills, chest pain, burning micturition.  Stated that she last passed flatus 3 days ago.  She presented to ED, CT scan was done with oral contrast and there was concern for a bowel obstruction versus a ileus.          Patient appeared alert and oriented, currently with NG tube at the time of examination, with approximately 600 cc output so far; general surgery consulted;     Code status-full code   Ambulates at home without assistance;            Overview/Hospital Course:  Ms. Wallis is an 82-year-old female with past medical history of hypertension, GERD, Raynauds D, insomnia, osteoporosis, ankle surgery 1977, cholecystectomy 2020, ventral hernia repair 2022,- recent lumbar disc surgery on August 11, 2023, (for chronic intractable low back pain, underwent at [facility]) presented to ED due to complaints of nausea, abdominal pain, discomfort, constipation over  past couple of days, which got worsened; At home she was taking Percocet q.4 hours.  She began to have abdominal discomfort and bowel distention and nausea and vomiting.  She is not had any bowel movements for the last 6 days.  Not sure if she was taking stool softners or not; denied fever, chills, chest pain, burning micturition.  Stated that she last passed flatus 3 days ago.  She presented to ED, CT scan was done with oral contrast and there was concern for a bowel obstruction versus a ileus.           Patient appeared alert and oriented, currently with NG tube at the time of examination, with approximately 600 cc output so far; general surgery consulted;     On 08/22, x-ray abdomen showed normal bowel pattern, general surgery recommended colitis NG tube, monitor for return of bowel movement, if patient has bowel movement then recommended to discontinue NG tube and initiate clear liquid diet accordingly, recommended GI on board.- GI recommended Brown bomb enema; Relistor 8mg SC QOD x 3 doses    On 08/23, patient noted to have 2-3 episodes of bowel movements, discussed with General surgery, recommended 500 mL of Colyte through her NG tube.  Followed by removal of NG tube, initiation of diet as per GI;     8/24- episode of bowel movement overnight, able to tolerate clear liquid diet, denied nausea, vomiting, abdominal pain.  We will advance diet to soft diet, we will follow up with General surgery.  PT OT evaluated and recommended home health      Interval History:     No acute events overnight   Hemodynamically stable, afebrile, no leukocytosis   Denied nausea, vomiting, abdominal pain, able to tolerate diet, episode of bowel movement overnight.    encouraged out of bed, PT OT ;  Updated plan of care to patient, niece at bedside      Review of Systems            Constitutional:  Negative for activity change, fatigue and fever.   HENT:  Negative for congestion, ear pain, facial swelling, nosebleeds, sinus pressure  and sore throat.    Eyes:  Negative for pain, discharge, redness and visual disturbance.   Respiratory:  Negative for cough, choking, chest tightness, shortness of breath and wheezing.    Cardiovascular:  Negative for chest pain, palpitations and leg swelling.   Gastrointestinal:  Negative for abdominal distention, abdominal pain and nausea.   Endocrine: Negative for heat intolerance, polydipsia and polyuria.   Genitourinary:  Negative for difficulty urinating, dysuria, flank pain, hematuria and urgency.   Musculoskeletal:  Positive for back pain. Negative for gait problem, joint swelling and myalgias.   Skin:  Negative for color change and rash.   Allergic/Immunologic: Negative for environmental allergies and food allergies.   Neurological:  Negative for dizziness, weakness, numbness and headaches.   Hematological:  Negative for adenopathy. Does not bruise/bleed easily.   Psychiatric/Behavioral:  Negative for agitation and behavioral problems. The patient is not nervous/anxious.       Objective:     Vital Signs (Most Recent):  Temp: 97.8 °F (36.6 °C) (08/24/23 0728)  Pulse: 68 (08/24/23 0811)  Resp: 16 (08/24/23 1117)  BP: (!) 166/72 (08/24/23 0728)  SpO2: 97 % (08/24/23 0811) Vital Signs (24h Range):  Temp:  [97.8 °F (36.6 °C)-98.6 °F (37 °C)] 97.8 °F (36.6 °C)  Pulse:  [56-70] 68  Resp:  [16-20] 16  SpO2:  [96 %-100 %] 97 %  BP: (142-194)/(62-80) 166/72     Weight: 57 kg (125 lb 10.6 oz)  Body mass index is 22.98 kg/m².    Intake/Output Summary (Last 24 hours) at 8/24/2023 1146  Last data filed at 8/24/2023 0732  Gross per 24 hour   Intake 6.67 ml   Output 1 ml   Net 5.67 ml         Physical Exam         Constitutional: She appears well-developed and well-nourished. She is not diaphoretic. No distress.   HENT:   Head: Normocephalic and atraumatic.   Mouth/Throat: No oropharyngeal exudate.   Eyes: Conjunctivae and EOM are normal. Pupils are equal, round, and reactive to light. Right eye exhibits no discharge. Left  eye exhibits no discharge. No scleral icterus.   Neck: Neck supple. No thyromegaly present. No tracheal deviation present. No JVD present.   Normal range of motion.  Cardiovascular:  Normal rate, regular rhythm, normal heart sounds and intact distal pulses.     Exam reveals no gallop and no friction rub.       No murmur heard.  Pulmonary/Chest: Breath sounds normal. No stridor. No respiratory distress. She has no wheezes. She has no rhonchi. She has no rales. She exhibits no tenderness.   Abdominal: Abdomen is soft. Bowel sounds are normal. She exhibits no distension and no mass. There is no abdominal tenderness. There is no rebound and no guarding.   Musculoskeletal:         General: No tenderness or edema.      Cervical back: Normal range of motion and neck supple.      Comments: Back surgical wound not undressed      Neurological: She is alert and oriented to person, place, and time. She has normal strength.   Skin: Skin is warm and dry. No rash and no abscess noted. No erythema.   Psychiatric: She has a normal mood and affect. Her behavior is normal. Judgment and thought content normal.   Significant Labs: All pertinent labs within the past 24 hours have been reviewed.  CBC:   Recent Labs   Lab 08/23/23  0517 08/24/23  0414   WBC 9.00 9.52   HGB 10.4* 9.3*   HCT 32.0* 27.9*    297     CMP:   Recent Labs   Lab 08/23/23  0517 08/24/23  0414    144   K 3.5 2.9*    104   CO2 34* 29   * 114*   BUN 9 5*   CREATININE 0.7 0.6   CALCIUM 9.6 8.5*   ANIONGAP 11 11       Significant Imaging:     Imaging Results              CT Abdomen Pelvis  Without Contrast (Final result)  Result time 08/21/23 04:46:16      Final result by Tom Aldana Jr., MD (08/21/23 04:46:16)                   Impression:        1.  Partial small bowel obstruction versus ileus.  Consider in NG tube for decompression.  No specific transition point.  2.  Foci of gas within the bladder.  Correlate with recent Allison catheter  placement versus cystitis.      All CT scans at [this location] are performed using dose modulation techniques as appropriate to a performed exam including the following: automated exposure control; adjustment of the mA and/or kV according to patient size (this includes techniques or standardized protocols for targeted exams where dose is matched to indication / reason for exam; i.e. extremities or head); use of iterative reconstruction technique.    Finalized on: 8/21/2023 4:46 AM By:  Tom Aldana MD  R# [id number]      2023-08-21 04:49:29.619    [facility]               Narrative:    EXAM: CT ABDOMEN PELVIS WITHOUT CONTRAST    CLINICAL HISTORY: Abdominal pain and distention.    COMPARISON: 04/15/2022.    TECHNIQUE: Axial CT imaging through the abdomen and pelvis performed without intravenous contrast are submitted for interpretation. Multiplanar reformats were performed and interpreted.    FINDINGS:    Abdomen:  Lung bases are clear.  The liver is normal in size and contour without focal mass.  No hydronephrosis.  No solid renal mass identified.  The spleen, adrenal glands, and pancreas are within normal limits.  Gallbladder surgically absent.    Trace free fluid in the pelvis. The stomach and small bowel are distended with air-fluid levels.  Maximum diameter of the small bowel is 3.8 cm in the right lower quadrant of the abdomen.  Decompressed ileum in the right lower quadrant of the abdomen.  Specifically abrupt transition point.  The brachial transition to normal caliber small bowel within the midabdomen.No abdominal lymphadenopathy.      Aorta caliber is normal.    Pelvis   Few gas bubbles within otherwise unremarkable urinary bladder.  Appendix not visualized.    The bones are intact.  Prior lumbar fusion posteriorly L3/L4.                                           X-Ray Abdomen Flat And Erect (Final result)  Result time 08/20/23 23:50:23      Final result by Carl Clemens MD (08/20/23 23:50:23)                    Impression:      Findings consistent with small-bowel obstruction.  Moderate amount of stool in the colon      Electronically signed by: Carl Jayleen  Date:    08/20/2023  Time:    23:50               Narrative:    EXAMINATION:  XR ABDOMEN FLAT AND ERECT    CLINICAL HISTORY:  constipation;    TECHNIQUE:  Flat and erect AP views of the abdomen were performed.    COMPARISON:  None    FINDINGS:  Spinal hardware.  Right upper quadrant right lower quadrant surgical clips.  Bowel obstruction is identified.  Mild moderate amount of stool in the colon.                                         Assessment/Plan:      Anemia  Denied any overt signs of bleeding  Follow-up on h and h and consider transfusion if hemoglobin less than 7      Leucocytosis  Likely reactive from underlying obstruction   Afebrile   Urine negative   Will hold antibiotics for now, we follow-up on fever,, WBC/monitor for signs of infection and consider antibiotics accordingly    8/22  Afebrile, wbc trended down       Hypertension  Currently NPO, hold medications as of now   Hydralazine p.r.n.      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    8/23    GI recommended Brown bomb enema; Relistor 8mg SC QOD x 3 doses    On 08/23, patient noted to have 2-3 episodes of bowel movements, discussed with General surgery, recommended 500 mL of Colyte through her NG tube.  Followed by removal of NG tube, initiation of diet  as per GI;           VTE Risk Mitigation (From admission, onward)         Ordered     enoxaparin injection 40 mg  Every 24 hours         08/21/23 1424     IP VTE HIGH RISK PATIENT  Once         08/21/23 1211     Place sequential compression device  Until discontinued         08/21/23 1211                Discharge Planning   JANETH:      Code Status: Full Code   Is the patient medically ready for discharge?:     Reason for patient still in hospital (select all that apply): Patient trending condition and Consult recommendations  Discharge Plan A: Home, Home with family, Home Health                  [facility] Ken Rojas MD  Department of Hospital Medicine   [facility] - Telemetry ([facility])

## 2023-08-24 NOTE — SUBJECTIVE & OBJECTIVE
Interval History:     No acute events overnight   Hemodynamically stable, afebrile, no leukocytosis   Denied nausea, vomiting, abdominal pain, able to tolerate diet, episode of bowel movement overnight.    encouraged out of bed, PT OT ;  Updated plan of care to patient, niece at bedside      Review of Systems            Constitutional:  Negative for activity change, fatigue and fever.   HENT:  Negative for congestion, ear pain, facial swelling, nosebleeds, sinus pressure and sore throat.    Eyes:  Negative for pain, discharge, redness and visual disturbance.   Respiratory:  Negative for cough, choking, chest tightness, shortness of breath and wheezing.    Cardiovascular:  Negative for chest pain, palpitations and leg swelling.   Gastrointestinal:  Negative for abdominal distention, abdominal pain and nausea.   Endocrine: Negative for heat intolerance, polydipsia and polyuria.   Genitourinary:  Negative for difficulty urinating, dysuria, flank pain, hematuria and urgency.   Musculoskeletal:  Positive for back pain. Negative for gait problem, joint swelling and myalgias.   Skin:  Negative for color change and rash.   Allergic/Immunologic: Negative for environmental allergies and food allergies.   Neurological:  Negative for dizziness, weakness, numbness and headaches.   Hematological:  Negative for adenopathy. Does not bruise/bleed easily.   Psychiatric/Behavioral:  Negative for agitation and behavioral problems. The patient is not nervous/anxious.       Objective:     Vital Signs (Most Recent):  Temp: 97.8 °F (36.6 °C) (08/24/23 0728)  Pulse: 68 (08/24/23 0811)  Resp: 16 (08/24/23 1117)  BP: (!) 166/72 (08/24/23 0728)  SpO2: 97 % (08/24/23 0811) Vital Signs (24h Range):  Temp:  [97.8 °F (36.6 °C)-98.6 °F (37 °C)] 97.8 °F (36.6 °C)  Pulse:  [56-70] 68  Resp:  [16-20] 16  SpO2:  [96 %-100 %] 97 %  BP: (142-194)/(62-80) 166/72     Weight: 57 kg (125 lb 10.6 oz)  Body mass index is 22.98 kg/m².    Intake/Output Summary  (Last 24 hours) at 8/24/2023 1146  Last data filed at 8/24/2023 0732  Gross per 24 hour   Intake 6.67 ml   Output 1 ml   Net 5.67 ml         Physical Exam         Constitutional: She appears well-developed and well-nourished. She is not diaphoretic. No distress.   HENT:   Head: Normocephalic and atraumatic.   Mouth/Throat: No oropharyngeal exudate.   Eyes: Conjunctivae and EOM are normal. Pupils are equal, round, and reactive to light. Right eye exhibits no discharge. Left eye exhibits no discharge. No scleral icterus.   Neck: Neck supple. No thyromegaly present. No tracheal deviation present. No JVD present.   Normal range of motion.  Cardiovascular:  Normal rate, regular rhythm, normal heart sounds and intact distal pulses.     Exam reveals no gallop and no friction rub.       No murmur heard.  Pulmonary/Chest: Breath sounds normal. No stridor. No respiratory distress. She has no wheezes. She has no rhonchi. She has no rales. She exhibits no tenderness.   Abdominal: Abdomen is soft. Bowel sounds are normal. She exhibits no distension and no mass. There is no abdominal tenderness. There is no rebound and no guarding.   Musculoskeletal:         General: No tenderness or edema.      Cervical back: Normal range of motion and neck supple.      Comments: Back surgical wound not undressed      Neurological: She is alert and oriented to person, place, and time. She has normal strength.   Skin: Skin is warm and dry. No rash and no abscess noted. No erythema.   Psychiatric: She has a normal mood and affect. Her behavior is normal. Judgment and thought content normal.   Significant Labs: All pertinent labs within the past 24 hours have been reviewed.  CBC:   Recent Labs   Lab 08/23/23  0517 08/24/23 0414   WBC 9.00 9.52   HGB 10.4* 9.3*   HCT 32.0* 27.9*    297     CMP:   Recent Labs   Lab 08/23/23  0517 08/24/23 0414    144   K 3.5 2.9*    104   CO2 34* 29   * 114*   BUN 9 5*   CREATININE 0.7 0.6    CALCIUM 9.6 8.5*   ANIONGAP 11 11       Significant Imaging:     Imaging Results              CT Abdomen Pelvis  Without Contrast (Final result)  Result time 08/21/23 04:46:16      Final result by Tom Aldana Jr., MD (08/21/23 04:46:16)                   Impression:        1.  Partial small bowel obstruction versus ileus.  Consider in NG tube for decompression.  No specific transition point.  2.  Foci of gas within the bladder.  Correlate with recent Allison catheter placement versus cystitis.      All CT scans at [this location] are performed using dose modulation techniques as appropriate to a performed exam including the following: automated exposure control; adjustment of the mA and/or kV according to patient size (this includes techniques or standardized protocols for targeted exams where dose is matched to indication / reason for exam; i.e. extremities or head); use of iterative reconstruction technique.    Finalized on: 8/21/2023 4:46 AM By:  Tom Aldana MD  [facility]# [id number]      2023-08-21 04:49:29.619    [facility]               Narrative:    EXAM: CT ABDOMEN PELVIS WITHOUT CONTRAST    CLINICAL HISTORY: Abdominal pain and distention.    COMPARISON: 04/15/2022.    TECHNIQUE: Axial CT imaging through the abdomen and pelvis performed without intravenous contrast are submitted for interpretation. Multiplanar reformats were performed and interpreted.    FINDINGS:    Abdomen:  Lung bases are clear.  The liver is normal in size and contour without focal mass.  No hydronephrosis.  No solid renal mass identified.  The spleen, adrenal glands, and pancreas are within normal limits.  Gallbladder surgically absent.    Trace free fluid in the pelvis. The stomach and small bowel are distended with air-fluid levels.  Maximum diameter of the small bowel is 3.8 cm in the right lower quadrant of the abdomen.  Decompressed ileum in the right lower quadrant of the abdomen.  Specifically abrupt transition point.  The brachial  transition to normal caliber small bowel within the midabdomen.No abdominal lymphadenopathy.      Aorta caliber is normal.    Pelvis   Few gas bubbles within otherwise unremarkable urinary bladder.  Appendix not visualized.    The bones are intact.  Prior lumbar fusion posteriorly L3/L4.                                           X-Ray Abdomen Flat And Erect (Final result)  Result time 08/20/23 23:50:23      Final result by Carl Clemens MD (08/20/23 23:50:23)                   Impression:      Findings consistent with small-bowel obstruction.  Moderate amount of stool in the colon      Electronically signed by: Carl Clemens  Date:    08/20/2023  Time:    23:50               Narrative:    EXAMINATION:  XR ABDOMEN FLAT AND ERECT    CLINICAL HISTORY:  constipation;    TECHNIQUE:  Flat and erect AP views of the abdomen were performed.    COMPARISON:  None    FINDINGS:  Spinal hardware.  Right upper quadrant right lower quadrant surgical clips.  Bowel obstruction is identified.  Mild moderate amount of stool in the colon.

## 2023-08-24 NOTE — PLAN OF CARE
Patient AAO X 4.  Tolerating clear liquid diet.  Family at bedside. D%LR infusing at 100ml/hr.  Patient has had a bowel movement and states her abd. Still has some discomfort but it is better than before.  Medicated as ordered for pain.  Back brace to be worn when up out of bed.  O2 in use via NC.

## 2023-08-24 NOTE — PHYSICIAN QUERY
PT Name: Jessenia Pa  MR #: [id number]     DOCUMENTATION CLARIFICATION     CDS/: Nallely William RN, CDS               Contact information: [email or web address]    This form is a permanent document in the medical record.     Query Date: August 24, 2023    By submitting this query, we are merely seeking further clarification of documentation.  Please utilize your independent clinical judgment when addressing the question(s) below.    The Medical Record contains the following:   Indicators   Supporting Clinical Findings Location in Medical Record    Non-blanchable erythema/redness      Ulcer/Injury/Skin Breakdown      Deep Tissue Injury     x Wound care consult Proximal and distal to incision is skin injury consistent with MARSI (Medical adhesive related skin injury) from prior surgical dressing. Open maroon and red blisters noted proximally x 2 areas.     Maroon discoloration is also noted to bilateral lower medial buttock that is consistent with DTPI    Altered Skin Integrity 08/22/23 Sacral spine Medical adhesive related skin injury  Date First Assessed: 08/22/23   Altered Skin Integrity Present on Admission - Did Patient arrive to the hospital with altered skin?: yes  Location: Sacral spine  Primary Wound Type: Medical adhesive related skin injury    Altered Skin Integrity 08/22/23  medial;lower Buttocks Purple or maroon localized area of discolored intact skin or non-intact skin or a blood-filled blister.     Wound Care Consult 8/22          Wound Care Consult 8/22      Wound Care Consult 8/22              Wound Care Consult 8/22        Wound Care Consult 8/22             x Acute/Chronic Illness 83 y/o F patient due to present on admission altered skin integrity. Patient reports recent lumbar spine surgery at outside facility, and reports silk tape dressing caused blistering and skin tears upon removal. She is currently in hospital bed, lying on right side with foam wedge, heels floated with pillow.    Wound Care Consult 8/22   x Medication/Treatment Recommend apply waffle mattress to hospital bed, reposition frequently with foam wedge.     Wound Care Consult 8/22    Other       The clinical guidelines noted are only a system guideline. It does not replace the providers clinical judgment.    Per the National Pressure Injury Advisory Panel:   A pressure injury is localized damage to the skin and underlying soft tissue usually over a bony prominence or related to a medical or other device. The injury can present as intact skin or an open ulcer and may be painful. The injury occurs as a result of intense and/or prolonged pressure or pressure in combination with shear. The tolerance of soft tissue for pressure and shear may also be affected by microclimate, nutrition, perfusion, co-morbidities and condition of the soft tissue.       Stage 1 Pressure Injury:  Intact skin with a localized area of non-blanchable erythema, which may appear differently in darkly pigmented skin. Color changes do not include purple or maroon discoloration; these may indicate deep tissue pressure injury.    Stage 2 Pressure Injury:  Partial-thickness loss of skin with exposed dermis. The wound bed is viable, pink or red, moist, and may also present as an intact or ruptured serum-filled blister.    Stage 3 Pressure Injury:  Full-thickness loss of skin, in which adipose (fat) is visible in the ulcer and granulation tissue and epibole (rolled wound edges) are often present. Slough and/or eschar may be visible. Undermining and tunneling may occur.    Stage 4 Pressure Injury:  Full-thickness skin and tissue loss with exposed or directly palpable fascia, muscle, tendon, ligament, cartilage or bone in the ulcer. Slough and/or eschar may be visible. Epibole (rolled edges), undermining and/or tunneling often occur.    Unstageable Pressure Injury:  Full-thickness skin and tissue loss in which the extent of tissue damage within the ulcer cannot be  confirmed because it is obscured by slough or eschar. If slough or eschar is removed, a Stage 3 or Stage 4 pressure injury will be revealed.    Deep Tissue Pressure Injury:  Intact or non-intact skin with localized area of persistent non-blanchable deep red, maroon, purple discoloration or epidermal separation revealing a dark wound bed or blood filled blister. This injury results from intense and/or prolonged pressure and shear forces at the bone-muscle interface. The wound may evolve rapidly to reveal the actual extent of tissue injury, or may resolve without tissue loss. If necrotic tissue, subcutaneous tissue, granulation tissue, fascia, muscle or other underlying structures are visible, this indicates a full thickness pressure injury (Unstageable, Stage 3 or Stage 4). Do not use DTPI to describe vascular, traumatic, neuropathic, or dermatologic conditions.   Medical Device Related Pressure Injury: This describes an etiology. Medical device related pressure injuries result from the use of devices designed and applied for diagnostic or therapeutic purposes. The resultant pressure injury generally conforms to the pattern or shape of the device. The injury should be staged using the staging system.        Provider, please provide the integumentary diagnosis related to the documentation of (bilateral buttocks):     [   ] Deep Tissue Pressure Injury   [ x  ] Other Integumentary Diagnosis (please specify):__superficial, present on arrival ____________   [  ] Clinically Undetermined       Provider, please provide the integumentary diagnosis related to the documentation of (sacral spine):     [   ] Medical Device Related Deep Tissue Pressure Injury   [ x  ] Other Integumentary Diagnosis (please specify):_______superficial injury; present on arrival _______   [  ] Clinically Undetermined           Please document in your progress notes daily for the duration of treatment until resolved and include in your discharge  summary.    Reference:    DURAN Ni., EDINSON Rowe., Goldberg, M., DURAN Hutton, DURAN Benavides, & MARBELLA Srivastava. (2016). Revised National Pressure Ulcer Advisory Panel Pressure Injury Staging System: Revised Pressure Injury Staging System. J Wound Ostomy Continence Nurs, 43(6), 585-597. doi:10.1097/won.9416050937658545    Form No.[id number]

## 2023-08-24 NOTE — PLAN OF CARE
Updated patient on plan of care. Instructed patient to use call light for assistance, call light in reach. Hourly rounding performed. Vitals q4 hours. PRN pain medications given, continuous fluids infusing. Education provided, questions answered/encouraged. Chart check complete. Not on telemetry.    Problem: Adult Inpatient Plan of Care  Goal: Plan of Care Review  Outcome: Ongoing, Progressing

## 2023-08-25 VITALS
OXYGEN SATURATION: 98 % | RESPIRATION RATE: 14 BRPM | HEIGHT: 62 IN | TEMPERATURE: 98 F | BODY MASS INDEX: 23.13 KG/M2 | WEIGHT: 125.69 LBS | DIASTOLIC BLOOD PRESSURE: 58 MMHG | SYSTOLIC BLOOD PRESSURE: 126 MMHG | HEART RATE: 64 BPM

## 2023-08-25 LAB
ANION GAP SERPL CALC-SCNC: 8 MMOL/L (ref 8–16)
BASOPHILS # BLD AUTO: 0.05 K/UL (ref 0–0.2)
BASOPHILS NFR BLD: 0.7 % (ref 0–1.9)
BUN SERPL-MCNC: 4 MG/DL (ref 8–23)
CALCIUM SERPL-MCNC: 8.7 MG/DL (ref 8.7–10.5)
CHLORIDE SERPL-SCNC: 109 MMOL/L (ref 95–110)
CO2 SERPL-SCNC: 25 MMOL/L (ref 23–29)
CREAT SERPL-MCNC: 0.7 MG/DL (ref 0.5–1.4)
DIFFERENTIAL METHOD: ABNORMAL
EOSINOPHIL # BLD AUTO: 0.3 K/UL (ref 0–0.5)
EOSINOPHIL NFR BLD: 4.4 % (ref 0–8)
ERYTHROCYTE [DISTWIDTH] IN BLOOD BY AUTOMATED COUNT: 13.3 % (ref 11.5–14.5)
EST. GFR  (NO RACE VARIABLE): >60 ML/MIN/1.73 M^2
GLUCOSE SERPL-MCNC: 110 MG/DL (ref 70–110)
HCT VFR BLD AUTO: 28.4 % (ref 37–48.5)
HGB BLD-MCNC: 9.1 G/DL (ref 12–16)
IMM GRANULOCYTES # BLD AUTO: 0.1 K/UL (ref 0–0.04)
IMM GRANULOCYTES NFR BLD AUTO: 1.5 % (ref 0–0.5)
LYMPHOCYTES # BLD AUTO: 1.5 K/UL (ref 1–4.8)
LYMPHOCYTES NFR BLD: 21.7 % (ref 18–48)
MAGNESIUM SERPL-MCNC: 1.6 MG/DL (ref 1.6–2.6)
MCH RBC QN AUTO: 29.4 PG (ref 27–31)
MCHC RBC AUTO-ENTMCNC: 32 G/DL (ref 32–36)
MCV RBC AUTO: 92 FL (ref 82–98)
MONOCYTES # BLD AUTO: 0.7 K/UL (ref 0.3–1)
MONOCYTES NFR BLD: 10.3 % (ref 4–15)
NEUTROPHILS # BLD AUTO: 4.2 K/UL (ref 1.8–7.7)
NEUTROPHILS NFR BLD: 61.4 % (ref 38–73)
NRBC BLD-RTO: 0 /100 WBC
PLATELET # BLD AUTO: 381 K/UL (ref 150–450)
PMV BLD AUTO: 10 FL (ref 9.2–12.9)
POTASSIUM SERPL-SCNC: 3.6 MMOL/L (ref 3.5–5.1)
RBC # BLD AUTO: 3.1 M/UL (ref 4–5.4)
SODIUM SERPL-SCNC: 142 MMOL/L (ref 136–145)
WBC # BLD AUTO: 6.81 K/UL (ref 3.9–12.7)

## 2023-08-25 PROCEDURE — 63600175 PHARM REV CODE 636 W HCPCS: Performed by: STUDENT IN AN ORGANIZED HEALTH CARE EDUCATION/TRAINING PROGRAM

## 2023-08-25 PROCEDURE — 97116 GAIT TRAINING THERAPY: CPT

## 2023-08-25 PROCEDURE — 25000003 PHARM REV CODE 250: Performed by: NURSE PRACTITIONER

## 2023-08-25 PROCEDURE — 85025 COMPLETE CBC W/AUTO DIFF WBC: CPT | Performed by: STUDENT IN AN ORGANIZED HEALTH CARE EDUCATION/TRAINING PROGRAM

## 2023-08-25 PROCEDURE — 25000003 PHARM REV CODE 250: Performed by: STUDENT IN AN ORGANIZED HEALTH CARE EDUCATION/TRAINING PROGRAM

## 2023-08-25 PROCEDURE — 94761 N-INVAS EAR/PLS OXIMETRY MLT: CPT

## 2023-08-25 PROCEDURE — 83735 ASSAY OF MAGNESIUM: CPT | Performed by: STUDENT IN AN ORGANIZED HEALTH CARE EDUCATION/TRAINING PROGRAM

## 2023-08-25 PROCEDURE — 97530 THERAPEUTIC ACTIVITIES: CPT

## 2023-08-25 PROCEDURE — 36415 COLL VENOUS BLD VENIPUNCTURE: CPT | Performed by: STUDENT IN AN ORGANIZED HEALTH CARE EDUCATION/TRAINING PROGRAM

## 2023-08-25 PROCEDURE — 80048 BASIC METABOLIC PNL TOTAL CA: CPT | Performed by: STUDENT IN AN ORGANIZED HEALTH CARE EDUCATION/TRAINING PROGRAM

## 2023-08-25 RX ORDER — POLYETHYLENE GLYCOL 3350 17 G/17G
17 POWDER, FOR SOLUTION ORAL 3 TIMES DAILY
Qty: 238 G | Refills: 0 | Status: SHIPPED | OUTPATIENT
Start: 2023-08-25 | End: 2023-08-30

## 2023-08-25 RX ORDER — POTASSIUM CHLORIDE 20 MEQ/1
40 TABLET, EXTENDED RELEASE ORAL ONCE
Status: COMPLETED | OUTPATIENT
Start: 2023-08-25 | End: 2023-08-25

## 2023-08-25 RX ORDER — LANOLIN ALCOHOL/MO/W.PET/CERES
400 CREAM (GRAM) TOPICAL ONCE
Status: COMPLETED | OUTPATIENT
Start: 2023-08-25 | End: 2023-08-25

## 2023-08-25 RX ORDER — OXYCODONE HYDROCHLORIDE 5 MG/1
5 TABLET ORAL EVERY 8 HOURS PRN
Qty: 6 TABLET | Refills: 0 | Status: SHIPPED | OUTPATIENT
Start: 2023-08-25 | End: 2023-08-28

## 2023-08-25 RX ORDER — ACETAMINOPHEN 500 MG
500 TABLET ORAL EVERY 6 HOURS PRN
Qty: 12 TABLET | Refills: 0 | Status: SHIPPED | OUTPATIENT
Start: 2023-08-25 | End: 2023-08-30

## 2023-08-25 RX ADMIN — OXYCODONE AND ACETAMINOPHEN 1 TABLET: 10; 325 TABLET ORAL at 09:08

## 2023-08-25 RX ADMIN — Medication 400 MG: at 08:08

## 2023-08-25 RX ADMIN — SODIUM CHLORIDE, SODIUM LACTATE, POTASSIUM CHLORIDE, CALCIUM CHLORIDE AND DEXTROSE MONOHYDRATE: 5; 600; 310; 30; 20 INJECTION, SOLUTION INTRAVENOUS at 03:08

## 2023-08-25 RX ADMIN — AMLODIPINE BESYLATE 5 MG: 5 TABLET ORAL at 08:08

## 2023-08-25 RX ADMIN — POTASSIUM CHLORIDE 40 MEQ: 1500 TABLET, EXTENDED RELEASE ORAL at 08:08

## 2023-08-25 NOTE — PLAN OF CARE
Patient discharged with personal belongings. Discharge education and medications reviewed with patient. LDA removed per MD order. Discharge medication paper script given pt.     Problem: Adult Inpatient Plan of Care  Goal: Plan of Care Review  8/25/2023 1356 by Chinyere Rincon RN  Outcome: Met  8/25/2023 1354 by Chinyere Rincon RN  Outcome: Ongoing, Not Progressing  Goal: Patient-Specific Goal (Individualized)  8/25/2023 1356 by Chinyere Rincon RN  Outcome: Met  8/25/2023 1354 by Chinyere Rincon RN  Outcome: Ongoing, Not Progressing  Goal: Absence of Hospital-Acquired Illness or Injury  8/25/2023 1356 by Chinyere Rincon RN  Outcome: Met  8/25/2023 1354 by Chinyere Rincon RN  Outcome: Ongoing, Not Progressing  Goal: Optimal Comfort and Wellbeing  8/25/2023 1356 by Chinyere Rincon RN  Outcome: Met  8/25/2023 1354 by Chinyere Rincon RN  Outcome: Ongoing, Not Progressing  Goal: Readiness for Transition of Care  8/25/2023 1356 by Chinyere Rincon RN  Outcome: Met  8/25/2023 1354 by Chinyere Rincon RN  Outcome: Ongoing, Not Progressing     Problem: Nausea and Vomiting  Goal: Fluid and Electrolyte Balance  8/25/2023 1356 by Chinyere Rincon RN  Outcome: Met  8/25/2023 1354 by Chinyere Rincon RN  Outcome: Ongoing, Not Progressing     Problem: Pain Acute  Goal: Acceptable Pain Control and Functional Ability  8/25/2023 1356 by Chinyere Rincon RN  Outcome: Met  8/25/2023 1354 by Chinyere Rincon RN  Outcome: Ongoing, Not Progressing     Problem: Fall Injury Risk  Goal: Absence of Fall and Fall-Related Injury  8/25/2023 1356 by Chinyere Rincon RN  Outcome: Met  8/25/2023 1354 by Chinyere Rincon RN  Outcome: Ongoing, Not Progressing     Problem: Impaired Wound Healing  Goal: Optimal Wound Healing  8/25/2023 1356 by Chinyere Rincon RN  Outcome: Met  8/25/2023 1354 by Chinyere Rincon RN  Outcome: Ongoing, Not Progressing

## 2023-08-25 NOTE — PT/OT/SLP PROGRESS
Physical Therapy Treatment    Patient Name:  Jessenia Pa   MRN:  [id number]    Recommendations:     Discharge Recommendations: home health PT  Discharge Equipment Recommendations: none  Barriers to discharge: None    Assessment:     Jessenia Pa is a 82 y.o. female admitted with a medical diagnosis of SBO (small bowel obstruction).  She presents with the following impairments/functional limitations: weakness, impaired endurance, impaired functional mobility, gait instability, impaired balance, pain, decreased safety awareness, decreased lower extremity function, decreased coordination.    Rehab Prognosis: Good; patient would benefit from acute skilled PT services to address these deficits and reach maximum level of function.    Recent Surgery: * No surgery found *     Plan:     During this hospitalization, patient to be seen 3 x/week to address the identified rehab impairments via gait training, therapeutic exercises, therapeutic activities and progress toward the following goals:    Plan of Care Expires:  09/05/23    Subjective     Chief Complaint: NONE, AGREEABLE TO TX  Patient/Family Comments/goals:   Pain/Comfort:  Pain Rating 1: 0/10      Objective:     Communicated with NURSE NOVAK prior to session.  Patient found  STANDING IN FRONT OF BED  with telemetry, peripheral IV upon PT entry to room.     General Precautions: Standard, fall  Orthopedic Precautions: spinal precautions  Braces: LSO  Respiratory Status: Room air     Functional Mobility:  Bed Mobility:     Scooting: stand by assistance  Transfers:     Sit to Stand:  contact guard assistance with rolling walker  Bed to Chair: contact guard assistance with  rolling walker  using  Step Transfer  Gait: PT ' WITH RW AND CGA, SLOW PACE, CUES FOR UPRIGHT POSTURE, NO LOB OR SOB ON ROOM AIR  Balance: FAIR SITTING AND STANDING BALANCE    AM-PAC 6 CLICK MOBILITY  Turning over in bed (including adjusting bedclothes, sheets and blankets)?: 1  Sitting down  "on and standing up from a chair with arms (e.g., wheelchair, bedside commode, etc.): 3  Moving from lying on back to sitting on the side of the bed?: 1  Moving to and from a bed to a chair (including a wheelchair)?: 3  Need to walk in hospital room?: 3  Climbing 3-5 steps with a railing?: 1  Basic Mobility Total Score: 12     Treatment & Education:  PT EDUCATION:  - ROLE OF P.T. AND POC IN ACUTE CARE HOSPITAL SETTING  - RW USE AND SAFETY DURING TF'S AND GAIT  - ENCOURAGED TO INCREASE TIME OOB IN CHAIR TO TOLERANCE   - DONNING/DOFFING LSO BRACE, SPINAL PRECAUTIONS   - TO CONTINUE THERAPUETIC EXERCISES THROUGHOUT THE DAY TO INCREASE ACTIVITY TOLERANCE AND DECREASE RISK FOR PNEUMONIA AND BLOOD CLOTS: HIP FLEX/EXT, HIP ABD/ADD, QUAD SET, HEEL SLIDE, AP  - RISK FOR FALLS DUE TO GENERALIZED WEAKNESS, EDUCATED ON "CALL DON'T FALL", ENCOURAGED TO CALL FOR ASSISTANCE WITH ALL NEEDS SUCH AS BED<>CHAIR TRANSFERS OR TRIPS TO BATHROOM, PT AGREEABLE TO SAFETY PRECAUTIONS    Patient left up in chair with all lines intact, call button in reach, chair alarm on, and NURSE notified..    GOALS:   Multidisciplinary Problems       Physical Therapy Goals          Problem: Physical Therapy    Goal Priority Disciplines Outcome Goal Variances Interventions   Physical Therapy Goal     PT, PT/OT Ongoing, Progressing     Description: LTG'S TO BE MET IN 14 DAYS (9-5-23)  PT WILL REQUIRE SBA FOR BED MOBILITY  PT WILL REQUIRE SBA FOR BED<>CHAIR TF'S  PT WILL  FEET WITH RW AND SBA  PT WILL INC AMPAC SCORE BY 2 POINTS TO PROGRESS GROSS FUNC MOBILITY                         Time Tracking:     PT Received On: 08/25/23  PT Start Time: 0815     PT Stop Time: 0840  PT Total Time (min): 25 min     Billable Minutes: Gait Training 10 and Therapeutic Activity 15    Treatment Type: Treatment  PT/PTA: PT     Number of PTA visits since last PT visit: 0     08/25/2023  "

## 2023-08-25 NOTE — DISCHARGE SUMMARY
[facility] - Tele[facility] ([facility])  [facility]  Discharge Summary      Patient Name: Jessenia Pa  MRN: [id number]  [facility]: [id number]  Patient Class: IP- Inpatient  Admission Date: 8/20/2023  Hospital Length of Stay: 3 days  Discharge Date and Time: 8/25/2023  Attending Physician: Hay Rojas,*   Discharging Provider: Hay Rojas MD  Primary Care Provider: Ana Brown MD    Primary Care Team: Networked reference to record PCT     HPI:   Ms. Wallis is an 82-year-old female with past medical history of hypertension, GERD, Raynauds D, insomnia, osteoporosis, ankle surgery 1977, cholecystectomy 2020, ventral hernia repair 2022,- recent lumbar disc surgery on August 11, 2023, (for chronic intractable low back pain, underwent at [facility]) presented to ED due to complaints of nausea, abdominal pain, discomfort, constipation over past couple of days, which got worsened; At home she was taking Percocet q.4 hours.  She began to have abdominal discomfort and bowel distention and nausea and vomiting.  She is not had any bowel movements for the last 6 days.  Not sure if she was taking stool softners or not; denied fever, chills, chest pain, burning micturition.  Stated that she last passed flatus 3 days ago.  She presented to ED, CT scan was done with oral contrast and there was concern for a bowel obstruction versus a ileus.          Patient appeared alert and oriented, currently with NG tube at the time of examination, with approximately 600 cc output so far; general surgery consulted;     Code status-full code   Ambulates at home without assistance;            * No surgery found *      Hospital Course:   Ms. Wallis is an 82-year-old female with past medical history of hypertension, GERD, Raynauds D, insomnia, osteoporosis, ankle surgery 1977, cholecystectomy 2020, ventral hernia repair 2022,- recent lumbar disc surgery on August 11, 2023, (for chronic intractable low back pain,  underwent at [facility]) presented to ED due to complaints of nausea, abdominal pain, discomfort, constipation over past couple of days, which got worsened; At home she was taking Percocet q.4 hours.  She began to have abdominal discomfort and bowel distention and nausea and vomiting.  She is not had any bowel movements for the last 6 days.  Not sure if she was taking stool softners or not; denied fever, chills, chest pain, burning micturition.  Stated that she last passed flatus 3 days ago.  She presented to ED, CT scan was done with oral contrast and there was concern for a bowel obstruction versus a ileus.           Patient appeared alert and oriented, currently with NG tube at the time of examination, with approximately 600 cc output so far; general surgery consulted;     On 08/22, x-ray abdomen showed normal bowel pattern, general surgery recommended colitis NG tube, monitor for return of bowel movement, if patient has bowel movement then recommended to discontinue NG tube and initiate clear liquid diet accordingly, recommended GI on board.- GI recommended Brown bomb enema; Relistor 8mg SC QOD x 3 doses    On 08/23, patient noted to have 2-3 episodes of bowel movements, discussed with General surgery, recommended 500 mL of Colyte through her NG tube.  Followed by removal of NG tube, initiation of diet as per GI;     8/24- episode of bowel movement overnight, able to tolerate clear liquid diet, denied nausea, vomiting, abdominal pain.  We will advance diet to soft diet, we will follow up with General surgery.  PT OT evaluated and recommended home health    8/25  Examination done at bedside, patient appeared alert and oriented x3, denied acute issues overnight, denied headache, dizziness, chest pain, shortness O breath, nausea, vomiting, bowel or bladder issues.    Advance diet yesterday, able to tolerate diet without issues, denied abdominal pain, nausea, vomiting, continues to have bowel movements.     No abdominal distention noted.    Considering clinical and hemodynamic stability, planning to discharge patient today, with home health orders, with stool softness, emphasized on side effects of pain medications, emphasized on stool softeners, diet, therapy- agreed to the plan, updated plan of care to patient's caretaker, agreed.  Discharge today, ordered home health, medications to be delivered at bedside.  Okay for discharge from GI standpoint            Review of Systems              Constitutional:  Negative for activity change, fatigue and fever.   HENT:  Negative for congestion, ear pain, facial swelling, nosebleeds, sinus pressure and sore throat.    Eyes:  Negative for pain, discharge, redness and visual disturbance.   Respiratory:  Negative for cough, choking, chest tightness, shortness of breath and wheezing.    Cardiovascular:  Negative for chest pain, palpitations and leg swelling.   Gastrointestinal:  Negative for abdominal distention, abdominal pain and nausea.   Endocrine: Negative for heat intolerance, polydipsia and polyuria.   Genitourinary:  Negative for difficulty urinating, dysuria, flank pain, hematuria and urgency.   Musculoskeletal:   Negative for gait problem, joint swelling and myalgias.   Skin:  Negative for color change and rash.   Allergic/Immunologic: Negative for environmental allergies and food allergies.   Neurological:  Negative for dizziness, weakness, numbness and headaches.   Hematological:  Negative for adenopathy. Does not bruise/bleed easily.   Psychiatric/Behavioral:  Negative for agitation and behavioral problems. The patient is not nervous/anxious.        Physical Exam         Constitutional: She appears well-developed and well-nourished. She is not diaphoretic. No distress.   HENT:   Head: Normocephalic and atraumatic.   Mouth/Throat: No oropharyngeal exudate.   Eyes: Conjunctivae and EOM are normal. Pupils are equal, round, and reactive to light. Right eye exhibits no  discharge. Left eye exhibits no discharge. No scleral icterus.   Neck: Neck supple. No thyromegaly present. No tracheal deviation present. No JVD present.   Normal range of motion.  Cardiovascular:  Normal rate, regular rhythm, normal heart sounds and intact distal pulses.     Exam reveals no gallop and no friction rub.       No murmur heard.  Pulmonary/Chest: Breath sounds normal. No stridor. No respiratory distress. She has no wheezes. She has no rhonchi. She has no rales. She exhibits no tenderness.   Abdominal: Abdomen is soft. Bowel sounds are normal. She exhibits no distension and no mass. There is no abdominal tenderness. There is no rebound and no guarding.   Musculoskeletal:         General: No tenderness or edema.      Cervical back: Normal range of motion and neck supple.      Comments: Back surgical wound not undressed      Neurological: She is alert and oriented to person, place, and time. She has normal strength.   Skin: Skin is warm and dry. No rash and no abscess noted. No erythema.   Psychiatric: She has a normal mood and affect. Her behavior is normal. Judgment and thought content normal.        Goals of Care Treatment Preferences:  Code Status: Full Code      Consults:   Consults (From admission, onward)        Status Ordering Provider     Inpatient consult to Gastroenterology  Once        Provider:  Ivania Mann MD    Completed WESLEY MARTINS     Inpatient consult to General Surgery  Once        Provider:  Ludwig Aguila MD    Completed WESLEY MARTINS          No new Assessment & Plan notes have been filed under this hospital service since the last note was generated.  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    Specialty: Home Health Services  Why: New name: [facility]  Contact information:  [address] [facility] [address] [phone]             Ana Brown MD Follow up in 1 week(s).    Specialty: Internal Medicine  Contact information:  [address] [facility] [address] [phone]             Danielle Roth MD Follow up in 1 week(s).    Specialty: Gastroenterology  Contact information:  [address]  [place] [address] [phone]                       Patient Instructions:   No discharge procedures on file.    Significant Diagnostic Studies:     Results for orders placed or performed during the hospital encounter of 08/20/23   CBC auto differential   Result Value Ref Range    WBC 13.12 (H) 3.90 - 12.70 K/uL    RBC 3.88 (L) 4.00 - 5.40 M/uL    Hemoglobin 11.8 (L) 12.0 - 16.0 g/dL    Hematocrit 34.9 (L) 37.0 - 48.5 %    MCV 90 82 - 98 fL    MCH 30.4 27.0 - 31.0 pg    MCHC 33.8 32.0 - 36.0 g/dL    RDW 13.3 11.5 - 14.5 %    Platelets 442 150 - 450 K/uL    MPV 9.4 9.2 - 12.9 fL    Immature Granulocytes 1.1 (H) 0.0 - 0.5 %    Gran # (ANC) 11.6 (H) 1.8 - 7.7 K/uL    Immature Grans (Abs) 0.14 (H) 0.00 - 0.04 K/uL    Lymph # 0.5 (L) 1.0 - 4.8 K/uL    Mono # 0.8 0.3 - 1.0 K/uL    Eos # 0.0 0.0 - 0.5 K/uL    Baso # 0.04 0.00 - 0.20 K/uL    nRBC 0 0 /100 WBC    Gran % 88.1 (H) 38.0 - 73.0 %    Lymph % 4.0 (L) 18.0 - 48.0 %    Mono % 6.3 4.0 - 15.0 %    Eosinophil % 0.2 0.0 - 8.0 %    Basophil % 0.3 0.0 - 1.9 %    Differential Method Automated    Comprehensive metabolic panel   Result Value Ref Range     Sodium 142 136 - 145 mmol/L    Potassium 4.0 3.5 - 5.1 mmol/L    Chloride 92 (L) 95 - 110 mmol/L    CO2 36 (H) 23 - 29 mmol/L    Glucose 149 (H) 70 - 110 mg/dL    BUN 15 8 - 23 mg/dL    Creatinine 1.1 0.5 - 1.4 mg/dL    Calcium 10.4 8.7 - 10.5 mg/dL    Total Protein 6.7 6.0 - 8.4 g/dL    Albumin 3.3 (L) 3.5 - 5.2 g/dL    Total Bilirubin 0.4 0.1 - 1.0 mg/dL    Alkaline Phosphatase 89 55 - 135 U/L    AST 24 10 - 40 U/L    ALT 16 10 - 44 U/L    eGFR 50.2 (A) >60 mL/min/1.73 m^2    Anion Gap 14 8 - 16 mmol/L   Urinalysis, Reflex to Urine Culture Urine, Clean Catch    Specimen: Urine   Result Value Ref Range    Specimen UA Urine, Clean Catch     Color, UA Yellow Yellow, Straw, Rhianna    Appearance, UA Clear Clear    pH, UA >=9.0 5.0 - 8.0    Specific Gravity, UA 1.015 1.005 - 1.030    Protein, UA 1+ (A) Negative    Glucose, UA Negative Negative    Ketones, UA Negative Negative    Bilirubin (UA) Negative Negative    Occult Blood UA Negative Negative    Nitrite, UA Negative Negative    Urobilinogen, UA <2.0 <2.0 EU/dL    Leukocytes, UA Negative Negative   Urinalysis Microscopic   Result Value Ref Range    RBC, UA 3 0 - 4 /hpf    WBC, UA 1 0 - 5 /hpf    Bacteria Few (A) None-Occ /hpf    Squam Epithel, UA 1 /hpf    Hyaline Casts, UA 5 (A) 0-1/lpf /lpf    Amorphous, UA Moderate None-Moderate    Microscopic Comment SEE COMMENT    CBC auto differential   Result Value Ref Range    WBC 11.64 3.90 - 12.70 K/uL    RBC 3.02 (L) 4.00 - 5.40 M/uL    Hemoglobin 9.3 (L) 12.0 - 16.0 g/dL    Hematocrit 27.9 (L) 37.0 - 48.5 %    MCV 92 82 - 98 fL    MCH 30.8 27.0 - 31.0 pg    MCHC 33.3 32.0 - 36.0 g/dL    RDW 13.6 11.5 - 14.5 %    Platelets 292 150 - 450 K/uL    MPV 9.1 (L) 9.2 - 12.9 fL    Immature Granulocytes 0.9 (H) 0.0 - 0.5 %    Gran # (ANC) 9.2 (H) 1.8 - 7.7 K/uL    Immature Grans (Abs) 0.10 (H) 0.00 - 0.04 K/uL    Lymph # 1.2 1.0 - 4.8 K/uL    Mono # 0.9 0.3 - 1.0 K/uL    Eos # 0.3 0.0 - 0.5 K/uL    Baso # 0.04 0.00 - 0.20 K/uL     nRBC 0 0 /100 WBC    Gran % 78.5 (H) 38.0 - 73.0 %    Lymph % 10.0 (L) 18.0 - 48.0 %    Mono % 7.6 4.0 - 15.0 %    Eosinophil % 2.7 0.0 - 8.0 %    Basophil % 0.3 0.0 - 1.9 %    Differential Method Automated    Basic metabolic panel   Result Value Ref Range    Sodium 140 136 - 145 mmol/L    Potassium 3.6 3.5 - 5.1 mmol/L    Chloride 98 95 - 110 mmol/L    CO2 34 (H) 23 - 29 mmol/L    Glucose 105 70 - 110 mg/dL    BUN 13 8 - 23 mg/dL    Creatinine 0.7 0.5 - 1.4 mg/dL    Calcium 8.9 8.7 - 10.5 mg/dL    Anion Gap 8 8 - 16 mmol/L    eGFR >60 >60 mL/min/1.73 m^2   Magnesium   Result Value Ref Range    Magnesium 1.8 1.6 - 2.6 mg/dL   CBC auto differential   Result Value Ref Range    WBC 9.00 3.90 - 12.70 K/uL    RBC 3.50 (L) 4.00 - 5.40 M/uL    Hemoglobin 10.4 (L) 12.0 - 16.0 g/dL    Hematocrit 32.0 (L) 37.0 - 48.5 %    MCV 91 82 - 98 fL    MCH 29.7 27.0 - 31.0 pg    MCHC 32.5 32.0 - 36.0 g/dL    RDW 13.2 11.5 - 14.5 %    Platelets 397 150 - 450 K/uL    MPV 9.9 9.2 - 12.9 fL    Immature Granulocytes 0.9 (H) 0.0 - 0.5 %    Gran # (ANC) 6.6 1.8 - 7.7 K/uL    Immature Grans (Abs) 0.08 (H) 0.00 - 0.04 K/uL    Lymph # 1.1 1.0 - 4.8 K/uL    Mono # 0.9 0.3 - 1.0 K/uL    Eos # 0.3 0.0 - 0.5 K/uL    Baso # 0.04 0.00 - 0.20 K/uL    nRBC 0 0 /100 WBC    Gran % 73.4 (H) 38.0 - 73.0 %    Lymph % 12.6 (L) 18.0 - 48.0 %    Mono % 9.6 4.0 - 15.0 %    Eosinophil % 3.1 0.0 - 8.0 %    Basophil % 0.4 0.0 - 1.9 %    Platelet Estimate Appears normal     Differential Method Automated    Basic metabolic panel   Result Value Ref Range    Sodium 145 136 - 145 mmol/L    Potassium 3.5 3.5 - 5.1 mmol/L    Chloride 100 95 - 110 mmol/L    CO2 34 (H) 23 - 29 mmol/L    Glucose 119 (H) 70 - 110 mg/dL    BUN 9 8 - 23 mg/dL    Creatinine 0.7 0.5 - 1.4 mg/dL    Calcium 9.6 8.7 - 10.5 mg/dL    Anion Gap 11 8 - 16 mmol/L    eGFR >60 >60 mL/min/1.73 m^2   Magnesium   Result Value Ref Range    Magnesium 1.7 1.6 - 2.6 mg/dL   TSH   Result Value Ref Range    TSH  0.743 0.400 - 4.000 uIU/mL   CBC auto differential   Result Value Ref Range    WBC 9.52 3.90 - 12.70 K/uL    RBC 3.06 (L) 4.00 - 5.40 M/uL    Hemoglobin 9.3 (L) 12.0 - 16.0 g/dL    Hematocrit 27.9 (L) 37.0 - 48.5 %    MCV 91 82 - 98 fL    MCH 30.4 27.0 - 31.0 pg    MCHC 33.3 32.0 - 36.0 g/dL    RDW 12.9 11.5 - 14.5 %    Platelets 297 150 - 450 K/uL    MPV 9.5 9.2 - 12.9 fL    Immature Granulocytes 0.9 (H) 0.0 - 0.5 %    Gran # (ANC) 7.0 1.8 - 7.7 K/uL    Immature Grans (Abs) 0.09 (H) 0.00 - 0.04 K/uL    Lymph # 1.2 1.0 - 4.8 K/uL    Mono # 0.9 0.3 - 1.0 K/uL    Eos # 0.3 0.0 - 0.5 K/uL    Baso # 0.04 0.00 - 0.20 K/uL    nRBC 0 0 /100 WBC    Gran % 73.0 38.0 - 73.0 %    Lymph % 12.7 (L) 18.0 - 48.0 %    Mono % 9.8 4.0 - 15.0 %    Eosinophil % 3.2 0.0 - 8.0 %    Basophil % 0.4 0.0 - 1.9 %    Differential Method Automated    Basic metabolic panel   Result Value Ref Range    Sodium 144 136 - 145 mmol/L    Potassium 2.9 (L) 3.5 - 5.1 mmol/L    Chloride 104 95 - 110 mmol/L    CO2 29 23 - 29 mmol/L    Glucose 114 (H) 70 - 110 mg/dL    BUN 5 (L) 8 - 23 mg/dL    Creatinine 0.6 0.5 - 1.4 mg/dL    Calcium 8.5 (L) 8.7 - 10.5 mg/dL    Anion Gap 11 8 - 16 mmol/L    eGFR >60 >60 mL/min/1.73 m^2   Magnesium   Result Value Ref Range    Magnesium 1.5 (L) 1.6 - 2.6 mg/dL   CBC auto differential   Result Value Ref Range    WBC 6.81 3.90 - 12.70 K/uL    RBC 3.10 (L) 4.00 - 5.40 M/uL    Hemoglobin 9.1 (L) 12.0 - 16.0 g/dL    Hematocrit 28.4 (L) 37.0 - 48.5 %    MCV 92 82 - 98 fL    MCH 29.4 27.0 - 31.0 pg    MCHC 32.0 32.0 - 36.0 g/dL    RDW 13.3 11.5 - 14.5 %    Platelets 381 150 - 450 K/uL    MPV 10.0 9.2 - 12.9 fL    Immature Granulocytes 1.5 (H) 0.0 - 0.5 %    Gran # (ANC) 4.2 1.8 - 7.7 K/uL    Immature Grans (Abs) 0.10 (H) 0.00 - 0.04 K/uL    Lymph # 1.5 1.0 - 4.8 K/uL    Mono # 0.7 0.3 - 1.0 K/uL    Eos # 0.3 0.0 - 0.5 K/uL    Baso # 0.05 0.00 - 0.20 K/uL    nRBC 0 0 /100 WBC    Gran % 61.4 38.0 - 73.0 %    Lymph % 21.7 18.0 - 48.0  %    Mono % 10.3 4.0 - 15.0 %    Eosinophil % 4.4 0.0 - 8.0 %    Basophil % 0.7 0.0 - 1.9 %    Differential Method Automated    Basic metabolic panel   Result Value Ref Range    Sodium 142 136 - 145 mmol/L    Potassium 3.6 3.5 - 5.1 mmol/L    Chloride 109 95 - 110 mmol/L    CO2 25 23 - 29 mmol/L    Glucose 110 70 - 110 mg/dL    BUN 4 (L) 8 - 23 mg/dL    Creatinine 0.7 0.5 - 1.4 mg/dL    Calcium 8.7 8.7 - 10.5 mg/dL    Anion Gap 8 8 - 16 mmol/L    eGFR >60 >60 mL/min/1.73 m^2   Magnesium   Result Value Ref Range    Magnesium 1.6 1.6 - 2.6 mg/dL       Imaging Results          CT Abdomen Pelvis  Without Contrast (Final result)  Result time 08/21/23 04:46:16    Final result by Tom Aldana Jr., MD (08/21/23 04:46:16)                 Impression:        1.  Partial small bowel obstruction versus ileus.  Consider in NG tube for decompression.  No specific transition point.  2.  Foci of gas within the bladder.  Correlate with recent Allison catheter placement versus cystitis.      All CT scans at [this location] are performed using dose modulation techniques as appropriate to a performed exam including the following: automated exposure control; adjustment of the mA and/or kV according to patient size (this includes techniques or standardized protocols for targeted exams where dose is matched to indication / reason for exam; i.e. extremities or head); use of iterative reconstruction technique.    Finalized on: 8/21/2023 4:46 AM By:  Tom Aldana MD  [facility]# [id number]      2023-08-21 04:49:29.619    [facility]             Narrative:    EXAM: CT ABDOMEN PELVIS WITHOUT CONTRAST    CLINICAL HISTORY: Abdominal pain and distention.    COMPARISON: 04/15/2022.    TECHNIQUE: Axial CT imaging through the abdomen and pelvis performed without intravenous contrast are submitted for interpretation. Multiplanar reformats were performed and interpreted.    FINDINGS:    Abdomen:  Lung bases are clear.  The liver is normal in size and contour  without focal mass.  No hydronephrosis.  No solid renal mass identified.  The spleen, adrenal glands, and pancreas are within normal limits.  Gallbladder surgically absent.    Trace free fluid in the pelvis. The stomach and small bowel are distended with air-fluid levels.  Maximum diameter of the small bowel is 3.8 cm in the right lower quadrant of the abdomen.  Decompressed ileum in the right lower quadrant of the abdomen.  Specifically abrupt transition point.  The brachial transition to normal caliber small bowel within the midabdomen.No abdominal lymphadenopathy.      Aorta caliber is normal.    Pelvis   Few gas bubbles within otherwise unremarkable urinary bladder.  Appendix not visualized.    The bones are intact.  Prior lumbar fusion posteriorly L3/L4.                                   X-Ray Abdomen Flat And Erect (Final result)  Result time 08/20/23 23:50:23    Final result by Carl Clemens MD (08/20/23 23:50:23)                 Impression:      Findings consistent with small-bowel obstruction.  Moderate amount of stool in the colon      Electronically signed by: Carl Clemens  Date:    08/20/2023  Time:    23:50             Narrative:    EXAMINATION:  XR ABDOMEN FLAT AND ERECT    CLINICAL HISTORY:  constipation;    TECHNIQUE:  Flat and erect AP views of the abdomen were performed.    COMPARISON:  None    FINDINGS:  Spinal hardware.  Right upper quadrant right lower quadrant surgical clips.  Bowel obstruction is identified.  Mild moderate amount of stool in the colon.                                Pending Diagnostic Studies:     None         Medications:         Medication List      START taking these medications    acetaminophen 500 MG tablet  Commonly known as: TYLENOL  Take 1 tablet (500 mg total) by mouth every 6 (six) hours as needed for Pain (mild pain).     oxyCODONE 5 MG immediate release tablet  Commonly known as: ROXICODONE  Take 1 tablet (5 mg total) by mouth every 8 (eight) hours as needed for  Pain (moderate pain).     polyethylene glycol 17 gram/dose powder  Commonly known as: GLYCOLAX  Take 17 g by mouth 3 (three) times daily. for 5 days        CONTINUE taking these medications    amLODIPine 5 MG tablet  Commonly known as: NORVASC     atorvastatin 10 MG tablet  Commonly known as: LIPITOR     azelaic acid 15 % gel  Commonly known as: AZELEX     cetirizine 10 MG tablet  Commonly known as: ZYRTEC     esomeprazole magnesium 20 mg Tbec     mupirocin 2 % ointment  Commonly known as: BACTROBAN     nystatin-triamcinolone cream  Commonly known as: MYCOLOG II     ondansetron 4 MG tablet  Commonly known as: ZOFRAN     oxybutynin 15 MG Tr24  Commonly known as: DITROPAN XL     traZODone 100 MG tablet  Commonly known as: DESYREL        STOP taking these medications    celecoxib 200 MG capsule  Commonly known as: CeleBREX     oxyCODONE-acetaminophen  mg per tablet  Commonly known as: PERCOCET     sulfamethoxazole-trimethoprim 800-160mg 800-160 mg Tab  Commonly known as: BACTRIM DS           Where to Get Your Medications      These medications were sent to [facility] [address] [place] [address]    Hours: Mon-Fri, 8a-5:30p Phone: [phone]   · acetaminophen 500 MG tablet  · polyethylene glycol 17 gram/dose powder     You can get these medications from any pharmacy    Bring a paper prescription for each of these medications  · oxyCODONE 5 MG immediate release tablet         Indwelling Lines/Drains at time of discharge:   Lines/Drains/Airways     None                 Time spent on the discharge of patient: 87 minutes         Hay Rojas MD  Department of Hospital Medicine  [facility] - Telemetry ([facility])

## 2023-08-25 NOTE — PLAN OF CARE
[facility] - Telemetry (Hospital)  Discharge Final Note    Primary Care Provider: Ana Brown MD    Expected Discharge Date: 8/25/2023    Final Discharge Note (most recent)       Final Note - 08/25/23 1115          Final Note    Assessment Type Final Discharge Note     Anticipated Discharge Disposition Home-Health Care [facility] [place]     Hospital Resources/Appts/Education Provided Post-Acute resouces added to AVS        Post-Acute Status    Post-Acute Authorization Home Health     Home Health Status Set-up Complete/Auth obtained     Discharge Delays None known at this time                   Pt to discharge home today. [facility] accepting. Pt's AVS updated for provider contact.     No CM needs/consults for discharge.     Important Message from Medicare  Important Message from Medicare regarding Discharge Appeal Rights: Given to patient/caregiver, Explained to patient/caregiver, Signed/date by patient/caregiver     Date IMM was signed: 08/25/23  Time IMM was signed: 0900    Contact Info       [facility] [place]   Specialty: Home Health Services    [facility]  [address] [address]   Phone: [phone]       Next Steps: Follow up    Instructions: New name: [facility]

## 2023-08-25 NOTE — DISCHARGE INSTRUCTIONS
Recommend compliance with medications, follow-up visits, diet, hydration    Recommended stool softeners while taking pain medications   Recommended compliance with physical therapy/occupation therapy

## 2023-08-25 NOTE — PT/OT/SLP PROGRESS
"Occupational Therapy   Treatment    Name: Jessenia Pa  MRN: [id number]  Admitting Diagnosis:  SBO (small bowel obstruction)       Recommendations:     Discharge Recommendations: home health OT  Discharge Equipment Recommendations:  none  Barriers to discharge:  None    Assessment:     Jessenia Pa is a 82 y.o. female with a medical diagnosis of SBO (small bowel obstruction).  She presents with the following performance deficits affecting function are weakness, impaired endurance, impaired sensation, impaired self care skills, impaired functional mobility, impaired balance, impaired cardiopulmonary response to activity, decreased safety awareness, impaired fine motor, orthopedic precautions, decreased upper extremity function.     Rehab Prognosis:  Good; patient would benefit from acute skilled OT services to address these deficits and reach maximum level of function.       Plan:     Patient to be seen 2 x/week to address the above listed problems via self-care/home management, therapeutic activities, therapeutic exercises  Plan of Care Expires: 09/05/23  Plan of Care Reviewed with: patient    Subjective     Chief Complaint: Reported "I am finally eating real food."  Patient/Family Comments/goals: none reported  Pain/Comfort:  Pain Rating 1: 3/10  Location 1: back  Pain Addressed 1:  (activity pacing)    Objective:     Communicated with: Nurse and EPIC prior to session.  Patient found  standing at edge of bed  with peripheral IV, telemetry upon OT entry to room.    General Precautions: Standard, fall    Orthopedic Precautions:spinal precautions  Braces: LSO  Respiratory Status: Room air     Occupational Performance:     Bed Mobility:    OOB at start and end of treatment session.    Functional Mobility/Transfers:  Patient completed Sit <> Stand Transfer with contact guard assistance  with  rolling walker   Patient completed Bed <> Chair Transfer using Step Transfer technique with contact guard assistance with " rolling walker  Functional Mobility: Patient completed x220ft functional mobility with RW and CGA to increase dynamic standing balance and activity tolerance needed for ADL completion.    Activities of Daily Living:  Feeding:  modified independence eating from breakfast tray from bedside chair without A.    [facility] 6 Click ADL: 17    Treatment & Education:  Patient tolerated session well overall. Donned LSO in standing with max A. Educated on need to wear at all times when OOB. Encouraged completion of B UE AROM therex, within spinal precautions, throughout the day to increase functional strength and activity tolerance needed for ADL completion. Provided with v/c for technique with transfers to increase safety and independence with completion. Mild SOB with exertion, that improves with rest break. Patient educated on benefits of OOB activity and importance of calling for A to transfer back to bed. Patient stated understanding and in agreement with POC.    Patient left up in chair with all lines intact, call button in reach, and chair alarm on    GOALS:   Multidisciplinary Problems       Occupational Therapy Goals          Problem: Occupational Therapy    Goal Priority Disciplines Outcome Interventions   Occupational Therapy Goal     OT, PT/OT Ongoing, Progressing    Description: Goals to be met by: 9/5/23     Patient will increase functional independence with ADLs by performing:    Toileting from toilet with Contact Guard Assistance for hygiene and clothing management.   Toilet transfer to toilet with Contact Guard Assistance.  Demonstrates functional compliance with spinal precautions.                         Time Tracking:     OT Date of Treatment: 08/25/23  OT Start Time: 0810  OT Stop Time: 0835  OT Total Time (min): 25 min    Billable Minutes:Therapeutic Activity 25    Bessie Caicedo OT  8/25/2023

## 2023-08-25 NOTE — PLAN OF CARE
Problem: Adult Inpatient Plan of Care  Goal: Plan of Care Review  Outcome: Ongoing, Progressing  Flowsheets (Taken 8/25/2023 0432)  Plan of Care Reviewed With: patient     Problem: Pain Acute  Goal: Acceptable Pain Control and Functional Ability  Outcome: Ongoing, Progressing  Intervention: Develop Pain Management Plan  Flowsheets (Taken 8/25/2023 0432)  Pain Management Interventions:   medication offered   position adjusted   pain management plan reviewed with patient/caregiver   care clustered

## 2024-10-05 ENCOUNTER — LAB VISIT (OUTPATIENT)
Dept: LAB | Facility: HOSPITAL | Age: 84
End: 2024-10-05
Payer: MEDICARE

## 2024-10-05 DIAGNOSIS — R41.3 MEMORY LOSS: Primary | ICD-10-CM

## 2024-10-05 PROCEDURE — 81401 MOPATH PROCEDURE LEVEL 2: CPT | Mod: GA

## 2024-10-05 PROCEDURE — 36415 COLL VENOUS BLD VENIPUNCTURE: CPT

## 2024-10-07 ENCOUNTER — PATIENT MESSAGE (OUTPATIENT)
Dept: RESEARCH | Facility: HOSPITAL | Age: 84
End: 2024-10-07
Payer: MEDICARE

## 2024-10-11 LAB
APOLIPOPROTEIN E REASON FOR REFERRAL: NORMAL
APOLIPOPROTEIN E RELEASED BY: NORMAL
APOLIPOPROTEIN E RESULT SUMMARY: NORMAL
APOLIPOPROTEIN E RESULT: NORMAL
APOLIPOPROTEIN E SPECIMEN: NORMAL
GENETICIST REVIEW: NORMAL
SPECIMEN SOURCE: NORMAL

## 2024-12-06 ENCOUNTER — OFFICE VISIT (OUTPATIENT)
Dept: ORTHOPEDICS | Facility: CLINIC | Age: 84
End: 2024-12-06
Payer: MEDICARE

## 2024-12-06 VITALS — WEIGHT: 131.19 LBS | BODY MASS INDEX: 26.45 KG/M2 | HEIGHT: 59 IN

## 2024-12-06 DIAGNOSIS — M17.11 PRIMARY OSTEOARTHRITIS OF RIGHT KNEE: Primary | ICD-10-CM

## 2024-12-06 PROCEDURE — 99999 PR PBB SHADOW E&M-EST. PATIENT-LVL III: CPT | Mod: PBBFAC,,, | Performed by: ORTHOPAEDIC SURGERY

## 2024-12-06 RX ORDER — METHYLPREDNISOLONE ACETATE 80 MG/ML
40 INJECTION, SUSPENSION INTRA-ARTICULAR; INTRALESIONAL; INTRAMUSCULAR; SOFT TISSUE
Status: DISCONTINUED | OUTPATIENT
Start: 2024-12-06 | End: 2024-12-06 | Stop reason: HOSPADM

## 2024-12-06 RX ORDER — DONEPEZIL HYDROCHLORIDE 5 MG/1
1 TABLET, FILM COATED ORAL NIGHTLY
COMMUNITY
Start: 2024-11-18 | End: 2025-11-18

## 2024-12-06 RX ORDER — KETOROLAC TROMETHAMINE 30 MG/ML
30 INJECTION, SOLUTION INTRAMUSCULAR; INTRAVENOUS
Status: DISCONTINUED | OUTPATIENT
Start: 2024-12-06 | End: 2024-12-06 | Stop reason: HOSPADM

## 2024-12-06 RX ADMIN — METHYLPREDNISOLONE ACETATE 40 MG: 80 INJECTION, SUSPENSION INTRA-ARTICULAR; INTRALESIONAL; INTRAMUSCULAR; SOFT TISSUE at 09:12

## 2024-12-06 RX ADMIN — KETOROLAC TROMETHAMINE 30 MG: 30 INJECTION, SOLUTION INTRAMUSCULAR; INTRAVENOUS at 09:12

## 2024-12-06 NOTE — PROGRESS NOTES
Subjective :  Right knee pain previous cortisone injection a month ago did not work, injection from August were great.    Patient ID: Jessenia Pa is a 84 y.o. female.    Chief Complaint: Pain of the Right Knee      HPI:  Mrs. Pa is 84 years of age.  She is a  female with valgus deformity and Kellgren Samuel grade 4 arthritis of the right knee.  She is a long-term patient of [facility].  This is her 1st visit with me at [facility] since my practice relocation.    We had tried Visco supplement in the past but she had a high co-pay and therefore it has not been tried.    There has been discussion about knee replacement in the past but she also has a back condition and she really does not want surgery if she can get by happily with conservative care    REVIEW OF SYSTEMS:  No recent illnesses.  No fevers chills or sweats       Objective     PHYSICAL EXAMINATION:  Pleasant cooperative alert and oriented.  Valgus angulation right knee 10+ degrees.  No effusion.  Slow antalgic gait.       Assessment and Plan     1. Primary osteoarthritis of right knee  Injection at the lateral joint line on the right knee performed with 40 mg Depo-Medrol, 1 cc of 0.25% Marcaine +30 mg Toradol  -     Large Joint Aspiration/Injection: L knee         Future plan:  Repeat the Depo plus Marcaine plus Toradol injection if effective every 3-4 months, if not consider Visco supplementation.  Patient really wants to avoid surgical treatment which we discussed as definitive option for end-stage arthritis.         As needed for injection.  Also states her right shoulder has been bothering her and she does have a previous diagnosis of right shoulder bursitis.  She can come back for shoulder injection in the future.

## 2024-12-06 NOTE — PROCEDURES
Large Joint Aspiration/Injection: L knee    Date/Time: 12/6/2024 9:00 AM    Performed by: Prince Juarez MD  Authorized by: Prince Juarez MD    Consent Done?:  Yes (Verbal)  Indications:  Joint swelling, arthritis and pain  Prep: patient was prepped and draped in usual sterile fashion    Local anesthetic:  Bupivacaine 0.5% without epinephrine    Details:  Needle Size:  22 G  Ultrasonic Guidance for needle placement?: No    Approach:  Anteromedial  Location:  Knee  Site:  L knee  Medications:  40 mg methylPREDNISolone acetate 80 mg/mL; 30 mg ketorolac 30 mg/mL (1 mL)      30 mg of Toradol was added to the intra-articular injection.  Injection tolerated without significant side effect or pain.

## 2025-02-05 ENCOUNTER — TELEPHONE (OUTPATIENT)
Dept: ORTHOPEDICS | Facility: CLINIC | Age: 85
End: 2025-02-05
Payer: MEDICARE

## 2025-02-05 NOTE — TELEPHONE ENCOUNTER
Patient was called and discussed getting put on the waitlist     ----- Message from Christ sent at 2/5/2025  1:29 PM CST -----  Contact: self  .Type:  Patient Returning Call    Who Called:..Jessenia Pa  Who Left Message for Patient:  Does the patient know what this is regarding?:Yes  Would the patient rather a call back or a response via [facility]? Call back  Best Call Back Number:..[phone]  Additional Information: Pt states she missed a call from 's office and is requesting a call back.

## 2025-02-19 ENCOUNTER — OFFICE VISIT (OUTPATIENT)
Dept: ORTHOPEDICS | Facility: CLINIC | Age: 85
End: 2025-02-19
Payer: MEDICARE

## 2025-02-19 VITALS — HEIGHT: 58 IN | WEIGHT: 131.19 LBS | BODY MASS INDEX: 27.54 KG/M2

## 2025-02-19 DIAGNOSIS — M17.11 PRIMARY OSTEOARTHRITIS OF RIGHT KNEE: Primary | ICD-10-CM

## 2025-02-19 RX ORDER — METHYLPREDNISOLONE ACETATE 40 MG/ML
40 INJECTION, SUSPENSION INTRA-ARTICULAR; INTRALESIONAL; INTRAMUSCULAR; SOFT TISSUE
Status: DISCONTINUED | OUTPATIENT
Start: 2025-02-19 | End: 2025-02-19 | Stop reason: HOSPADM

## 2025-02-19 RX ADMIN — METHYLPREDNISOLONE ACETATE 40 MG: 40 INJECTION, SUSPENSION INTRA-ARTICULAR; INTRALESIONAL; INTRAMUSCULAR; SOFT TISSUE at 01:02

## 2025-02-19 NOTE — PROGRESS NOTES
Prince Juarez MD  Orthopedic Surgeon   [address] [facility] [place] [address]                                                                                                                    VISIT DATE: 2/19/2025      Name: Jessenia Pa  MRN: [id number]  Date: 2/19/2025  Time: 2:14 PM  PCP: Dr. Ana Brown  Insurance: [employer]    Reason For Visit: Right knee steroid injection    There are no Patient Instructions on file for this visit.    Chief Complaint: Pain of the Right Knee    Encounter Diagnosis   Name Primary?    Primary osteoarthritis of right knee Yes       ASSESSMENT:  Chronic back pain after lumbar surgery.  Chronic pain of the right knee due to osteoarthritis.  3-4 weeks of relief after previous Depo-Medrol injection with Toradol on December 6, 2024    PLAN:  Repeat injection performed right knee with 40 mg Depo-Medrol, 30 mg Toradol and 1 cc of Marcaine 0.25%  Appointment made for May 3 months from now to consider repeat injection for management of persistent arthritic symptoms.  The patient does not desire knee replacement surgery trying to avoid any more major procedures    HISTORY Jessenia Pa is a 84 y.o. female.   Patient presents today with right knee pain rating of seven out of ten. Patient stated swelling and popping sound.  Injection about 9 weeks ago in early December only lasted for a couple weeks.  Keller there was really been bothering her knee lately.    PHYSICAL EXAMINATION:  Body mass index is 27.42 kg/m².      GENERAL: Patient well groomed, pleasant with positive affect.    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    SOCIAL HISTORY:   Social History     Occupational History    Not on file   Tobacco Use    Smoking status: Never    Smokeless tobacco: Never   Substance and Sexual Activity    Alcohol use: Never    Drug use: Never    Sexual activity: Never       Ten minute patient encounter  This includes face to face time and non-face to face time preparing to see the patient (eg, review of tests),   obtaining and/or reviewing separately obtained history, documenting clinical information in the electronic record,   I independently interpreting results and communicating results to the patient/family/caregiver, or care coordinator.              Prince Juarez M.D.  Orthopedic Surgeon  Director of Outpatient Joint Replacement Program  [facility]             [1]   Current Outpatient Medications:     amLODIPine (NORVASC) 5 MG tablet, Take 1 tablet by mouth once daily., Disp: , Rfl:     azelaic acid (AZELEX) 15 % gel, Place onto the skin., Disp: , Rfl:     cetirizine (ZYRTEC) 10 MG tablet, Take 10 mg by mouth., Disp: , Rfl:     donepeziL (ARICEPT) 5 MG tablet, Take 1 tablet by mouth every evening., Disp: , Rfl:     esomeprazole magnesium 20 mg TbEC, Take 20 mg by mouth., Disp: , Rfl:     mupirocin (BACTROBAN) 2 % ointment, Apply to nostrils twice daily and continue through at least 5 days post-op, Disp: , Rfl:      nystatin-triamcinolone (MYCOLOG II) cream, Apply under breasts topically twice daily for 14 days, then use as needed, Disp: , Rfl:     ondansetron (ZOFRAN) 4 MG tablet, Take 4 mg by mouth every 6 (six) hours as needed., Disp: , Rfl:     oxybutynin (DITROPAN XL) 15 MG TR24, Take 1 tablet by mouth once daily., Disp: , Rfl:     traZODone (DESYREL) 100 MG tablet, Take 1 tablet by mouth every evening., Disp: , Rfl:     atorvastatin (LIPITOR) 10 MG tablet, Take 10 mg by mouth., Disp: , Rfl:

## 2025-02-19 NOTE — PROCEDURES
Large Joint Aspiration/Injection: R knee    Date/Time: 2/19/2025 1:45 PM    Performed by: Prince Juarez MD  Authorized by: Prince Juarez MD    Consent Done?:  Yes (Verbal)  Indications:  Joint swelling, pain and arthritis  Local anesthetic:  Bupivacaine 0.25% without epinephrine    Details:  Needle Size:  22 G  Ultrasonic Guidance for needle placement?: No    Approach:  Anteromedial  Location:  Knee  Site:  R knee  Medications:  40 mg methylPREDNISolone acetate 40 mg/mL

## 2025-05-15 ENCOUNTER — TELEPHONE (OUTPATIENT)
Dept: ORTHOPEDICS | Facility: CLINIC | Age: 85
End: 2025-05-15
Payer: MEDICARE

## 2025-05-15 NOTE — TELEPHONE ENCOUNTER
----- Message from Bianka sent at 5/15/2025  9:30 AM CDT -----  Contact: Jessenia  .Type:  Patient Request Call BackWho Called:Adolph the patient know what this is regarding?: Appointment AdviceWould the patient rather a call back or a response via [facility]? Call [facility] Call Back Number:[phone] Information:

## 2025-05-15 NOTE — TELEPHONE ENCOUNTER
Patient called to switch appointments with her friend. I let her know her friend would have to call and schedule her appointment on her own. She wants patient to come on the same day as her. I let her know if patient called we could make that happen if it helped her. Patient understood .

## 2025-05-28 ENCOUNTER — TELEPHONE (OUTPATIENT)
Dept: ORTHOPEDICS | Facility: CLINIC | Age: 85
End: 2025-05-28
Payer: MEDICARE

## 2025-05-28 NOTE — TELEPHONE ENCOUNTER
----- Message from Brendan sent at 5/28/2025  8:34 AM CDT -----  Pt Requesting Call BackThad called: Brenton called for pt:Best call back #:  [phone] notes: pt said she needs to speak to Trenty regarding her today's appt; I'm sending on same day access because pt said she doesn't know if she will have her come in today or not

## 2025-05-28 NOTE — TELEPHONE ENCOUNTER
Spoke with patient on the phone and rescheduled her appointment per her request due to inactive insurance. She verbalized understanding of new appointment time.

## 2025-06-20 DIAGNOSIS — M17.11 PRIMARY OSTEOARTHRITIS OF RIGHT KNEE: Primary | ICD-10-CM

## 2025-07-09 ENCOUNTER — OFFICE VISIT (OUTPATIENT)
Dept: ORTHOPEDICS | Facility: CLINIC | Age: 85
End: 2025-07-09
Payer: MEDICARE

## 2025-07-09 VITALS — BODY MASS INDEX: 27.54 KG/M2 | HEIGHT: 58 IN | WEIGHT: 131.19 LBS

## 2025-07-09 DIAGNOSIS — M17.11 PRIMARY OSTEOARTHRITIS OF RIGHT KNEE: Primary | ICD-10-CM

## 2025-07-09 PROCEDURE — 99999 PR PBB SHADOW E&M-EST. PATIENT-LVL II: CPT | Mod: PBBFAC,,, | Performed by: ORTHOPAEDIC SURGERY

## 2025-07-09 NOTE — PROCEDURES
Large Joint Aspiration/Injection: R knee    Date/Time: 7/9/2025 1:30 PM    Performed by: Prince Juarez MD  Authorized by: Prince Juarez MD    Consent Done?:  Yes (Verbal)  Indications:  Pain and arthritis  Timeout: prior to procedure the correct patient, procedure, and site was verified    Prep: patient was prepped and draped in usual sterile fashion    Local anesthetic:  Topical anesthetic    Details:  Needle Size:  22 G  Ultrasonic Guidance for needle placement?: No    Approach:  Medial  Location:  Knee  Site:  R knee  Medications:  60 mg hyaluronate sodium, stabilized (DUROLANE) 60 mg/3 mL  Aspirate amount (mL):  0  Patient tolerance:  Patient tolerated the procedure well with no immediate complications

## 2025-07-09 NOTE — PROGRESS NOTES
Prince Juarez MD  Orthopedic Surgeon   [address] [facility] [place] [address]     VISIT DATE: 7/9/2025      Name: Jessenia Pa  MRN: [id number]    PCP: Ana Brown MD  Insurance: Payor: [employer] MEDICARE / Plan: [employer] MEDICARE FMOL / Product Type: Medicare Advantage /     Reason for Visit:  Visco supplement injection    Patient ID: Jessenia Pa is a 84 y.o. female.   Chief Complaint of Pain of the Right Knee        Patient Instructions   No diagnosis found.    ASSESSMENT:  Chronic osteoarthritis of the right knee    TREATMENT/PLAN:  Durolane Right knee intra-articular injection  RTO 6 months, for repeat viscosupplementation if current treatment effective            Prince Juarez M.D.  Orthopedic Surgeon  [facility]

## 2025-07-09 NOTE — PATIENT INSTRUCTIONS
No diagnosis found.    ASSESSMENT:  Chronic osteoarthritis of the right knee    TREATMENT/PLAN:  Durolane Right knee intra-articular injection  RTO 6 months, for repeat viscosupplementation if current treatment effective

## 2025-07-11 ENCOUNTER — TELEPHONE (OUTPATIENT)
Dept: ORTHOPEDICS | Facility: CLINIC | Age: 85
End: 2025-07-11
Payer: MEDICARE

## 2025-07-11 NOTE — TELEPHONE ENCOUNTER
Copied from CRM #[id number]. Topic: General Inquiry - Patient Advice  >> Jul 11, 2025  9:39 AM Gricelda wrote:  Type:  Needs Medical Advice    Who Called: Jessenia Pa   Would the patient rather a call back or a response via [facility]? call  Best Call Back Number: [phone]   Additional Information:  pt states she is still having having knee pain after receiving her injection.

## 2025-07-11 NOTE — TELEPHONE ENCOUNTER
Patient call has been returned regarding knee pain after injection and also a brief voicemail was left.

## 2025-08-10 ENCOUNTER — HOSPITAL ENCOUNTER (EMERGENCY)
Facility: HOSPITAL | Age: 85
Discharge: HOME | End: 2025-08-10
Attending: EMERGENCY MEDICINE
Payer: MEDICARE

## 2025-08-10 VITALS
WEIGHT: 131 LBS | TEMPERATURE: 99 F | HEART RATE: 80 BPM | SYSTOLIC BLOOD PRESSURE: 148 MMHG | RESPIRATION RATE: 20 BRPM | BODY MASS INDEX: 27.38 KG/M2 | DIASTOLIC BLOOD PRESSURE: 68 MMHG | OXYGEN SATURATION: 95 %

## 2025-08-10 DIAGNOSIS — S49.91XA INJURY OF RIGHT SHOULDER, INITIAL ENCOUNTER: ICD-10-CM

## 2025-08-10 DIAGNOSIS — S09.90XA INJURY OF HEAD, INITIAL ENCOUNTER: ICD-10-CM

## 2025-08-10 DIAGNOSIS — S81.812A SKIN TEAR OF LEFT LOWER LEG WITHOUT COMPLICATION, INITIAL ENCOUNTER: ICD-10-CM
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
## 2025-08-10 PROCEDURE — 25000003 PHARM REV CODE 250: Mod: ER | Performed by: REGISTERED NURSE

## 2025-08-10 PROCEDURE — 99285 EMERGENCY DEPT VISIT HI MDM: CPT | Mod: 25,ER

## 2025-08-10 RX ORDER — BACITRACIN ZINC 500 UNIT/G
OINTMENT (GRAM) TOPICAL ONCE
Status: COMPLETED | OUTPATIENT
Start: 2025-08-10 | End: 2025-08-10

## 2025-08-10 RX ORDER — HYDROCODONE BITARTRATE AND ACETAMINOPHEN 5; 325 MG/1; MG/1
1 TABLET ORAL
Refills: 0 | Status: COMPLETED | OUTPATIENT
Start: 2025-08-10 | End: 2025-08-10

## 2025-08-10 RX ADMIN — BACITRACIN ZINC: 500 OINTMENT TOPICAL at 08:08

## 2025-08-10 RX ADMIN — HYDROCODONE BITARTRATE AND ACETAMINOPHEN 1 TABLET: 5; 325 TABLET ORAL at 08:08
